# Patient Record
Sex: FEMALE | Race: WHITE | HISPANIC OR LATINO | Employment: FULL TIME | URBAN - METROPOLITAN AREA
[De-identification: names, ages, dates, MRNs, and addresses within clinical notes are randomized per-mention and may not be internally consistent; named-entity substitution may affect disease eponyms.]

---

## 2017-01-09 ENCOUNTER — OFFICE VISIT (OUTPATIENT)
Dept: MEDICAL GROUP | Facility: MEDICAL CENTER | Age: 57
End: 2017-01-09
Payer: COMMERCIAL

## 2017-01-09 VITALS
RESPIRATION RATE: 16 BRPM | WEIGHT: 152 LBS | SYSTOLIC BLOOD PRESSURE: 128 MMHG | DIASTOLIC BLOOD PRESSURE: 72 MMHG | BODY MASS INDEX: 27.97 KG/M2 | TEMPERATURE: 97.2 F | OXYGEN SATURATION: 95 % | HEIGHT: 62 IN | HEART RATE: 82 BPM

## 2017-01-09 DIAGNOSIS — J01.90 ACUTE NON-RECURRENT SINUSITIS, UNSPECIFIED LOCATION: ICD-10-CM

## 2017-01-09 DIAGNOSIS — J45.20 MILD INTERMITTENT ASTHMA WITHOUT COMPLICATION: ICD-10-CM

## 2017-01-09 DIAGNOSIS — Z23 NEED FOR PNEUMOCOCCAL VACCINATION: ICD-10-CM

## 2017-01-09 PROCEDURE — 90471 IMMUNIZATION ADMIN: CPT | Performed by: NURSE PRACTITIONER

## 2017-01-09 PROCEDURE — 99214 OFFICE O/P EST MOD 30 MIN: CPT | Mod: 25 | Performed by: NURSE PRACTITIONER

## 2017-01-09 PROCEDURE — 90732 PPSV23 VACC 2 YRS+ SUBQ/IM: CPT | Performed by: NURSE PRACTITIONER

## 2017-01-09 RX ORDER — DOXYCYCLINE HYCLATE 100 MG
100 TABLET ORAL 2 TIMES DAILY
Qty: 20 TAB | Refills: 0 | Status: SHIPPED | OUTPATIENT
Start: 2017-01-09 | End: 2017-07-25

## 2017-01-09 ASSESSMENT — ENCOUNTER SYMPTOMS
COUGH: 1
WHEEZING: 1

## 2017-01-09 NOTE — MR AVS SNAPSHOT
"Lyn Morris   2017 2:40 PM   Office Visit   MRN: [id number]    Department:  [address] [place] [address]   Dept Phone:  [phone]    Description:  Female : 1960   Provider:  BHARGAV Atwood           Reason for Visit     Sinus Problem pressure     Cough           Allergies as of 2017     No Known Allergies      You were diagnosed with     Acute non-recurrent sinusitis, unspecified location   [4989167]       Mild intermittent asthma without complication   [243849]       Need for pneumococcal vaccination   [200907]         Vital Signs     Blood Pressure Pulse Temperature Respirations Height Weight    128/72 mmHg 82 36.2 °C (97.2 °F) 16 1.575 m (5' 2.01\") 68.947 kg (152 lb)    Body Mass Index Oxygen Saturation Last Menstrual Period Smoking Status          27.79 kg/m2 95% 2008 Never Smoker         Basic Information     Date Of Birth Sex Race Ethnicity Preferred Language    1960 Female  or   Origin (Bulgarian,Chadian,Faroese,Lawrence, etc) English      Your appointments     2017 10:10 AM   MA SCRN10 with RB MG 3   [facility] ([address])    [address] [place] [address] [address]  [facility] [id number] [phone]           No deodorant, powder, perfume or lotion under the arm or breast area.              Problem List              ICD-10-CM Priority Class Noted - Resolved    History of hepatitis C Z86.19   2010 - Present    Asthma, mild intermittent J45.20   2015 - Present    Urinary incontinence R32   2015 - Present    Seasonal allergies J30.2   2016 - Present      Health Maintenance        Date Due Completion Dates    IMM DTaP/Tdap/Td Vaccine (1 - Tdap) 1980 ---    IMM PNEUMOCOCCAL 19-64 (ADULT) MEDIUM RISK SERIES (1 of 1 - PPSV23) 1980 ---    IMM INFLUENZA (1) 2016 10/5/2012    MAMMOGRAM 2017, 2014, 2013, 2012, 6/3/2011, 2010, 2010, 2008, 2008, 2008, " 2/5/2008, 8/20/2007, 8/20/2007, 4/5/2006, 3/30/2005, 3/15/2005    COLONOSCOPY 2/25/2024 2/25/2014 (N/S), 11/1/2003 (Prv Comp)    Override on 2/25/2014: (N/S)    Override on 11/1/2003: Previously completed            Current Immunizations     Influenza TIV (IM) 10/5/2012    Influenza Vaccine Quad Inj (Preserved) 10/23/2016    Pneumococcal polysaccharide vaccine (PPSV-23) 1/9/2017  2:53 PM      Below and/or attached are the medications your provider expects you to take. Review all of your home medications and newly ordered medications with your provider and/or pharmacist. Follow medication instructions as directed by your provider and/or pharmacist. Please keep your medication list with you and share with your provider. Update the information when medications are discontinued, doses are changed, or new medications (including over-the-counter products) are added; and carry medication information at all times in the event of emergency situations     Allergies:  No Known Allergies          Medications  Valid as of: January 09, 2017 -  3:35 PM    Generic Name Brand Name Tablet Size Instructions for use    Albuterol Sulfate (Aero Soln) albuterol 108 (90 BASE) MCG/ACT Inhale 2 Puffs by mouth every 6 hours as needed for Shortness of Breath.        Doxycycline Hyclate (Tab) VIBRAMYCIN 100 MG Take 1 Tab by mouth 2 times a day.        Fluticasone Propionate (Suspension) FLONASE 50 MCG/ACT Spray 2 Sprays in nose every day.        Levothyroxine Sodium (Tab) SYNTHROID 88 MCG TAKE ONE TABLET BY MOUTH ONCE DAILY        .                 Medicines prescribed today were sent to:     [facility] [address] [place] [address] - [address] [place] [address]    Phone: [phone] Fax: [phone]    Open 24 Hours?: No      Medication refill instructions:       If your prescription bottle indicates you have medication refills left, it is not necessary to call your provider’s office. Please contact your  pharmacy and they will refill your medication.    If your prescription bottle indicates you do not have any refills left, you may request refills at any time through one of the following ways: The online [employer] system (except Urgent Care), by calling your provider’s office, or by asking your pharmacy to contact your provider’s office with a refill request. Medication refills are processed only during regular business hours and may not be available until the next business day. Your provider may request additional information or to have a follow-up visit with you prior to refilling your medication.   *Please Note: Medication refills are assigned a new Rx number when refilled electronically. Your pharmacy may indicate that no refills were authorized even though a new prescription for the same medication is available at the pharmacy. Please request the medicine by name with the pharmacy before contacting your provider for a refill.           [employer] Access Code: Activation code not generated  Current [employer] Status: Active

## 2017-01-09 NOTE — PROGRESS NOTES
Subjective:      Lyn Morris is a 56 y.o. female who presents with Sinus Problem and Cough            Sinus Problem  Associated symptoms include congestion and coughing.   Cough  Associated symptoms include wheezing.   Lyn Morris is here today for continuing sinus issues.     Patient reports that for about 3 weeks she has been having symptoms. They tend to come and go and are mostly sinus pain and pressure, cough, and expectoration of phlegm. She has tried Flonase nasal spray and once used an antihistamine but nothing seems to help. Symptoms are worse when she lays down. She denies fever, chills, ear pain, chest pain or shortness of breath. She does have a history of asthma for which she uses albuterol infrequently. She has not needed preventative inhalers.        Social History   Substance Use Topics   • Smoking status: Never Smoker    • Smokeless tobacco: Never Used   • Alcohol Use: No     Current Outpatient Prescriptions   Medication Sig Dispense Refill   • doxycycline (VIBRAMYCIN) 100 MG Tab Take 1 Tab by mouth 2 times a day. 20 Tab 0   • levothyroxine (SYNTHROID) 88 MCG Tab TAKE ONE TABLET BY MOUTH ONCE DAILY 90 Tab 3   • fluticasone (FLONASE) 50 MCG/ACT nasal spray Spray 2 Sprays in nose every day. 16 g 11   • albuterol (VENTOLIN OR PROVENTIL) 108 (90 BASE) MCG/ACT AERS inhalation aerosol Inhale 2 Puffs by mouth every 6 hours as needed for Shortness of Breath. 8.5 g 3     No current facility-administered medications for this visit.     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 Bhagat   • Hypertension      resolved 2012   • Family history of diabetes mellitus      Family History   Problem Relation Age of Onset   • Cancer Sister      breast   • Hypertension Father    • Diabetes Father    • Hypertension Brother        Review of Systems   HENT: Positive for congestion.    Respiratory: Positive for  "cough and wheezing.    All other systems reviewed and are negative.         Objective:     /72 mmHg  Pulse 82  Temp(Src) 36.2 °C (97.2 °F)  Resp 16  Ht 1.575 m (5' 2.01\")  Wt 68.947 kg (152 lb)  BMI 27.79 kg/m2  SpO2 95%  LMP 12/04/2008     Physical Exam   Constitutional: She is oriented to person, place, and time. She appears well-developed and well-nourished. No distress.   HENT:   Head: Normocephalic and atraumatic.   Right Ear: External ear normal.   Left Ear: External ear normal.   Nose: Right sinus exhibits maxillary sinus tenderness. Left sinus exhibits maxillary sinus tenderness.   Eyes: Right eye exhibits no discharge. Left eye exhibits no discharge.   Neck: Normal range of motion. Neck supple. No thyromegaly present.   Cardiovascular: Normal rate, regular rhythm and normal heart sounds.  Exam reveals no gallop and no friction rub.    No murmur heard.  Pulmonary/Chest: Effort normal and breath sounds normal. She has no wheezes. She has no rales.   Musculoskeletal: She exhibits no edema or tenderness.   Neurological: She is alert and oriented to person, place, and time. She displays normal reflexes.   Skin: Skin is warm and dry. No rash noted. She is not diaphoretic.   Psychiatric: She has a normal mood and affect. Her behavior is normal. Judgment and thought content normal.   Nursing note and vitals reviewed.              Assessment/Plan:     1. Acute non-recurrent sinusitis, unspecified location  Sinus infection teaching provided including;  A. Take antibiotics as prescribed until finished.  B. Use over-the-counter Tylenol, Aleve, or ibuprofen as needed for discomfort.  C. Consider Mucinex to help loosen secretions.  D. Sinus irrigations may be helpful. Consider using a Nedi Pot. Normal saline spray may help prevent sinus irritation in the future.  E. Increase humidity in your home with a humidifier or putting head over a steaming pot of water.  F. Consider seeing a ENT if symptoms do not " improve or if symptoms becomes recurrent.  - doxycycline (VIBRAMYCIN) 100 MG Tab; Take 1 Tab by mouth 2 times a day.  Dispense: 20 Tab; Refill: 0    2. Mild intermittent asthma without complication  Patient at this point does not need a preventative inhaler but should always have albuterol available when she is short of breath and a prescription will be sent when needed.    3. Need for pneumococcal vaccination  I have placed the below orders and discussed them with an approved delegating provider. The MA is performing the below orders under the direction of Dr. Vega    - PNEUMOCOCCAL POLYSACCHARIDE VACCINE 23-VALENT =>1YO SQ/IM

## 2017-02-09 ENCOUNTER — HOSPITAL ENCOUNTER (OUTPATIENT)
Dept: RADIOLOGY | Facility: MEDICAL CENTER | Age: 57
End: 2017-02-09
Attending: NURSE PRACTITIONER
Payer: COMMERCIAL

## 2017-02-09 DIAGNOSIS — Z12.31 SCREENING MAMMOGRAM, ENCOUNTER FOR: ICD-10-CM

## 2017-02-09 PROCEDURE — 77063 BREAST TOMOSYNTHESIS BI: CPT

## 2017-02-14 ENCOUNTER — HOSPITAL ENCOUNTER (OUTPATIENT)
Dept: LAB | Facility: MEDICAL CENTER | Age: 57
End: 2017-02-14
Attending: SPECIALIST
Payer: COMMERCIAL

## 2017-02-14 LAB
ALBUMIN SERPL BCP-MCNC: 4.2 G/DL (ref 3.2–4.9)
ALBUMIN/GLOB SERPL: 1.1 G/DL
ALP SERPL-CCNC: 127 U/L (ref 30–99)
ALT SERPL-CCNC: 20 U/L (ref 2–50)
ANION GAP SERPL CALC-SCNC: 5 MMOL/L (ref 0–11.9)
AST SERPL-CCNC: 19 U/L (ref 12–45)
BASOPHILS # BLD AUTO: 0.04 K/UL (ref 0–0.12)
BASOPHILS NFR BLD AUTO: 1.1 % (ref 0–1.8)
BILIRUB SERPL-MCNC: 0.5 MG/DL (ref 0.1–1.5)
BUN SERPL-MCNC: 15 MG/DL (ref 8–22)
CALCIUM SERPL-MCNC: 9.5 MG/DL (ref 8.5–10.5)
CHLORIDE SERPL-SCNC: 105 MMOL/L (ref 96–112)
CO2 SERPL-SCNC: 27 MMOL/L (ref 20–33)
CREAT SERPL-MCNC: 0.63 MG/DL (ref 0.5–1.4)
EOSINOPHIL # BLD: 0.02 K/UL (ref 0–0.51)
EOSINOPHIL NFR BLD AUTO: 0.5 % (ref 0–6.9)
ERYTHROCYTE [DISTWIDTH] IN BLOOD BY AUTOMATED COUNT: 47.6 FL (ref 35.9–50)
GLOBULIN SER CALC-MCNC: 3.8 G/DL (ref 1.9–3.5)
GLUCOSE SERPL-MCNC: 95 MG/DL (ref 65–99)
HCT VFR BLD AUTO: 44.7 % (ref 37–47)
HGB BLD-MCNC: 15.1 G/DL (ref 12–16)
IMM GRANULOCYTES # BLD AUTO: 0 K/UL (ref 0–0.11)
IMM GRANULOCYTES NFR BLD AUTO: 0 % (ref 0–0.9)
LYMPHOCYTES # BLD: 1.07 K/UL (ref 1–4.8)
LYMPHOCYTES NFR BLD AUTO: 28.3 % (ref 22–41)
MCH RBC QN AUTO: 33.3 PG (ref 27–33)
MCHC RBC AUTO-ENTMCNC: 33.8 G/DL (ref 33.6–35)
MCV RBC AUTO: 98.5 FL (ref 81.4–97.8)
MONOCYTES # BLD: 0.52 K/UL (ref 0–0.85)
MONOCYTES NFR BLD AUTO: 13.8 % (ref 0–13.4)
NEUTROPHILS # BLD: 2.13 K/UL (ref 2–7.15)
NEUTROPHILS NFR BLD AUTO: 56.3 % (ref 44–72)
NRBC # BLD AUTO: 0 K/UL
NRBC BLD-RTO: 0 /100 WBC
PLATELET # BLD AUTO: 197 K/UL (ref 164–446)
PMV BLD AUTO: 10.9 FL (ref 9–12.9)
POTASSIUM SERPL-SCNC: 4 MMOL/L (ref 3.6–5.5)
PROT SERPL-MCNC: 8 G/DL (ref 6–8.2)
RBC # BLD AUTO: 4.54 M/UL (ref 4.2–5.4)
SODIUM SERPL-SCNC: 137 MMOL/L (ref 135–145)
WBC # BLD AUTO: 3.8 K/UL (ref 4.8–10.8)

## 2017-02-14 PROCEDURE — 36415 COLL VENOUS BLD VENIPUNCTURE: CPT

## 2017-02-14 PROCEDURE — 82105 ALPHA-FETOPROTEIN SERUM: CPT

## 2017-02-14 PROCEDURE — 85025 COMPLETE CBC W/AUTO DIFF WBC: CPT

## 2017-02-14 PROCEDURE — 87522 HEPATITIS C REVRS TRNSCRPJ: CPT

## 2017-02-14 PROCEDURE — 80053 COMPREHEN METABOLIC PANEL: CPT

## 2017-02-16 LAB — AFP-TM SERPL-MCNC: 3 NG/ML (ref 0–9)

## 2017-02-24 LAB
DETECTION Z3100: NORMAL
HCV RNA # SERPL NAA+PROBE: NORMAL COPIES/ML
HCV RNA SERPL NAA+PROBE-ACNC: NORMAL IU/ML
HCV RNA SERPL NAA+PROBE-LOG IU: NORMAL LOG10 IU/ML
HCV RNA SERPL NAA+PROBE-LOG#: NORMAL LOG10COPY/ML
TEST INFO  93644: NORMAL

## 2017-03-03 ENCOUNTER — HOSPITAL ENCOUNTER (OUTPATIENT)
Dept: RADIOLOGY | Facility: MEDICAL CENTER | Age: 57
End: 2017-03-03
Attending: SPECIALIST
Payer: COMMERCIAL

## 2017-03-03 DIAGNOSIS — B18.2 CHRONIC HEPATITIS C WITH HEPATIC COMA (HCC): ICD-10-CM

## 2017-03-03 PROCEDURE — 76700 US EXAM ABDOM COMPLETE: CPT

## 2017-03-30 ENCOUNTER — HOSPITAL ENCOUNTER (OUTPATIENT)
Dept: RADIOLOGY | Facility: MEDICAL CENTER | Age: 57
End: 2017-03-30
Attending: SPECIALIST
Payer: COMMERCIAL

## 2017-03-30 DIAGNOSIS — R16.0 LIVER MASS: ICD-10-CM

## 2017-03-30 PROCEDURE — 74183 MRI ABD W/O CNTR FLWD CNTR: CPT

## 2017-03-30 PROCEDURE — 700117 HCHG RX CONTRAST REV CODE 255: Performed by: SPECIALIST

## 2017-03-30 PROCEDURE — A9579 GAD-BASE MR CONTRAST NOS,1ML: HCPCS | Performed by: SPECIALIST

## 2017-03-30 RX ADMIN — GADODIAMIDE 15 ML: 287 INJECTION INTRAVENOUS at 13:45

## 2017-07-25 ENCOUNTER — OFFICE VISIT (OUTPATIENT)
Dept: MEDICAL GROUP | Facility: MEDICAL CENTER | Age: 57
End: 2017-07-25
Payer: COMMERCIAL

## 2017-07-25 ENCOUNTER — HOSPITAL ENCOUNTER (OUTPATIENT)
Dept: LAB | Facility: MEDICAL CENTER | Age: 57
End: 2017-07-25
Attending: FAMILY MEDICINE
Payer: COMMERCIAL

## 2017-07-25 VITALS
WEIGHT: 154 LBS | HEIGHT: 62 IN | TEMPERATURE: 98.6 F | OXYGEN SATURATION: 97 % | RESPIRATION RATE: 14 BRPM | HEART RATE: 72 BPM | SYSTOLIC BLOOD PRESSURE: 124 MMHG | DIASTOLIC BLOOD PRESSURE: 76 MMHG | BODY MASS INDEX: 28.34 KG/M2

## 2017-07-25 DIAGNOSIS — R11.0 NAUSEA: ICD-10-CM

## 2017-07-25 DIAGNOSIS — R74.8 ELEVATED ALKALINE PHOSPHATASE LEVEL: ICD-10-CM

## 2017-07-25 LAB
ERYTHROCYTE [DISTWIDTH] IN BLOOD BY AUTOMATED COUNT: 47.1 FL (ref 35.9–50)
GGT SERPL-CCNC: 12 U/L (ref 7–34)
HCT VFR BLD AUTO: 41.2 % (ref 37–47)
HGB BLD-MCNC: 14 G/DL (ref 12–16)
MCH RBC QN AUTO: 33.7 PG (ref 27–33)
MCHC RBC AUTO-ENTMCNC: 34 G/DL (ref 33.6–35)
MCV RBC AUTO: 99.3 FL (ref 81.4–97.8)
PLATELET # BLD AUTO: 221 K/UL (ref 164–446)
PMV BLD AUTO: 11.4 FL (ref 9–12.9)
RBC # BLD AUTO: 4.15 M/UL (ref 4.2–5.4)
WBC # BLD AUTO: 6.2 K/UL (ref 4.8–10.8)

## 2017-07-25 PROCEDURE — 36415 COLL VENOUS BLD VENIPUNCTURE: CPT

## 2017-07-25 PROCEDURE — 82977 ASSAY OF GGT: CPT

## 2017-07-25 PROCEDURE — 80053 COMPREHEN METABOLIC PANEL: CPT

## 2017-07-25 PROCEDURE — 99213 OFFICE O/P EST LOW 20 MIN: CPT | Performed by: FAMILY MEDICINE

## 2017-07-25 PROCEDURE — 83690 ASSAY OF LIPASE: CPT

## 2017-07-25 PROCEDURE — 85027 COMPLETE CBC AUTOMATED: CPT

## 2017-07-25 ASSESSMENT — PATIENT HEALTH QUESTIONNAIRE - PHQ9: CLINICAL INTERPRETATION OF PHQ2 SCORE: 0

## 2017-07-25 NOTE — PROGRESS NOTES
cc: Nausea    Subjective:     Lyn Morris is a 57 y.o. female presenting with her daughter with nausea that started yesterday. After dinner, she developed nausea and vomiting, some dizziness as well. This morning she woke up somewhat nauseated. Has not vomited yet today. Seems to be improving. Denies any associated symptoms. Denies fevers, headaches, neck pain, chest pain, abdominal pain, urinary symptoms, vaginal symptoms, numbness, tingling, rashes, recent travel, new foods, new medications. She does have a history of hepatitis C and a liver lesion on ultrasound back in March, MRI showing a liver cyst. She has not followed-up with GI since    Review of systems:  See above.      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 Bhagat   • Hypertension      resolved 2012   • Family history of diabetes mellitus      Past Surgical History   Procedure Laterality Date   • Vaginal hysterectomy total  12/11/2008     Performed by KATY CONN at SURGERY SAME DAY [facility]   • Anterior and posterior repair  12/11/2008     Performed by KATY CONN at SURGERY SAME DAY [facility]   • Colonoscopy  2003     normal     Family History   Problem Relation Age of Onset   • Cancer Sister      breast   • Hypertension Father    • Diabetes Father    • Hypertension Brother      Social History     Social History   • Marital Status:      Spouse Name: N/A   • Number of Children: N/A  "  • Years of Education: N/A     Occupational History   • [employer], CNA      Social History Main Topics   • Smoking status: Never Smoker    • Smokeless tobacco: Never Used   • Alcohol Use: No   • Drug Use: No   • Sexual Activity:     Partners: Male     Other Topics Concern   • Not on file     Social History Narrative       Objective:     Vitals: /76 mmHg  Pulse 72  Temp(Src) 37 °C (98.6 °F)  Resp 14  Ht 1.575 m (5' 2\")  Wt 69.854 kg (154 lb)  BMI 28.16 kg/m2  SpO2 97%  LMP 12/04/2008  General: Alert, pleasant, NAD  HEENT: Normocephalic.  PERRL, EOMI, no icterus or pallor.  Conjunctivae and lids normal. External ears normal. Neck supple.  No thyromegaly or masses palpated. No cervical or supraclavicular lymphadenopathy.  Heart: Regular rate and rhythm.  S1 and S2 normal.  No murmurs appreciated.  Respiratory: Normal respiratory effort.  Clear to auscultation bilaterally.  Abdomen: Non-distended, soft, non-tender, no guarding/rebound. Bowel sounds present.  No hepatosplenomegaly.  No hernias.  Skin: Warm, dry, no rashes.  Musculoskeletal: Gait is normal.  Moves all extremities well.  Extremities: No leg edema.    Neurological: No tremors  Psych:  Affect/mood is normal, judgement is good, memory is intact, grooming is appropriate.    MRI 3/2017:  1.  No solid mass is identified to definitively correlate with subcentimeter lesion described on recent ultrasound.  2.  5 mm lesion in segment 8 is consistent with a cyst, possibly proteinaceous resulting in a hyperechoic appearance on ultrasound.  3.  Dilated portal vein is suggestive of portal venous hypertension.    Assessment/Plan:     Lyn was seen today for nausea.    Diagnoses and all orders for this visit:    Nausea  Elevated alkaline phosphatase level  Possible viral gastroenteritis, however given her MRI and elevated alkaline phosphatase level, we'll repeat the labs. Advised her to follow-up with GI as well. Offered prescription for Zofran, however " patient declined. She feels like she is improving on her own. Advised a brat diet  -     CBC WITHOUT DIFFERENTIAL; Future  -     COMP METABOLIC PANEL; Future  -     GAMMA GT (GGT); Future  -     LIPASE; Future      Return if symptoms worsen or fail to improve.

## 2017-07-25 NOTE — MR AVS SNAPSHOT
"Lyn Morris   2017 2:00 PM   Office Visit   MRN: [id number]    Department:  [address] [place] [address]   Dept Phone:  [phone]    Description:  Female : 1960   Provider:  Cornelius Lofton M.D.           Reason for Visit     Nausea x 1 day      Allergies as of 2017     No Known Allergies      You were diagnosed with     Nausea   [860227]       Elevated alkaline phosphatase level   [146233]         Vital Signs     Blood Pressure Pulse Temperature Respirations Height Weight    124/76 mmHg 72 37 °C (98.6 °F) 14 1.575 m (5' 2\") 69.854 kg (154 lb)    Body Mass Index Oxygen Saturation Last Menstrual Period Smoking Status          28.16 kg/m2 97% 2008 Never Smoker         Basic Information     Date Of Birth Sex Race Ethnicity Preferred Language    1960 Female  or   Origin (Kyrgyz,Bahraini,Chilean,Lawrence, etc) English      Your appointments     2017  2:00 PM   Established Patient with Cornelius Lofton M.D.   [address])    [address] [place] [address]  [facility] [id number]   [phone]           You will be receiving a confirmation call a few days before your appointment from our automated call confirmation system.              Problem List              ICD-10-CM Priority Class Noted - Resolved    History of hepatitis C Z86.19   2010 - Present    Asthma, mild intermittent J45.20   2015 - Present    Urinary incontinence R32   2015 - Present    Seasonal allergies J30.2   2016 - Present      Health Maintenance        Date Due Completion Dates    IMM DTaP/Tdap/Td Vaccine (1 - Tdap) 1980 ---    IMM INFLUENZA (1) 2017 10/23/2016, 10/5/2012    MAMMOGRAM 2018, 2016, 2014, 2013, 2012, 6/3/2011, 2010, 2010, 2008, 2008, 2008, 2008, 2007, 2007, 2006, 3/30/2005, 3/15/2005    COLONOSCOPY 2/3/2027 2/3/2017            Current Immunizations    "    Influenza TIV (IM) 10/5/2012    Influenza Vaccine Quad Inj (Preserved) 10/23/2016    Pneumococcal polysaccharide vaccine (PPSV-23) 1/9/2017  2:53 PM      Below and/or attached are the medications your provider expects you to take. Review all of your home medications and newly ordered medications with your provider and/or pharmacist. Follow medication instructions as directed by your provider and/or pharmacist. Please keep your medication list with you and share with your provider. Update the information when medications are discontinued, doses are changed, or new medications (including over-the-counter products) are added; and carry medication information at all times in the event of emergency situations     Allergies:  No Known Allergies          Medications  Valid as of: July 25, 2017 -  1:54 PM    Generic Name Brand Name Tablet Size Instructions for use    Albuterol Sulfate (Aero Soln) albuterol 108 (90 BASE) MCG/ACT Inhale 2 Puffs by mouth every 6 hours as needed for Shortness of Breath.        Fluticasone Propionate (Suspension) FLONASE 50 MCG/ACT Spray 2 Sprays in nose every day.        Levothyroxine Sodium (Tab) SYNTHROID 88 MCG TAKE ONE TABLET BY MOUTH ONCE DAILY        .                 Medicines prescribed today were sent to:     [facility] [address] [place] [id number]    Phone: [phone] Fax: [phone]    Open 24 Hours?: No      Medication refill instructions:       If your prescription bottle indicates you have medication refills left, it is not necessary to call your provider’s office. Please contact your pharmacy and they will refill your medication.    If your prescription bottle indicates you do not have any refills left, you may request refills at any time through one of the following ways: The online [employer] system (except Urgent Care), by calling your provider’s office, or by asking your pharmacy to contact your provider’s  office with a refill request. Medication refills are processed only during regular business hours and may not be available until the next business day. Your provider may request additional information or to have a follow-up visit with you prior to refilling your medication.   *Please Note: Medication refills are assigned a new Rx number when refilled electronically. Your pharmacy may indicate that no refills were authorized even though a new prescription for the same medication is available at the pharmacy. Please request the medicine by name with the pharmacy before contacting your provider for a refill.        Your To Do List     Future Labs/Procedures Complete By Expires    CBC WITHOUT DIFFERENTIAL  As directed 1/22/2018    COMP METABOLIC PANEL  As directed 7/25/2018    GAMMA GT (GGT)  As directed 7/25/2018    LIPASE  As directed 7/25/2018         MyChart Access Code: Activation code not generated  Current [employer] Status: Active

## 2017-07-26 LAB
ALBUMIN SERPL BCP-MCNC: 4 G/DL (ref 3.2–4.9)
ALBUMIN/GLOB SERPL: 1.1 G/DL
ALP SERPL-CCNC: 107 U/L (ref 30–99)
ALT SERPL-CCNC: 18 U/L (ref 2–50)
ANION GAP SERPL CALC-SCNC: 8 MMOL/L (ref 0–11.9)
AST SERPL-CCNC: 25 U/L (ref 12–45)
BILIRUB SERPL-MCNC: 0.5 MG/DL (ref 0.1–1.5)
BUN SERPL-MCNC: 11 MG/DL (ref 8–22)
CALCIUM SERPL-MCNC: 9 MG/DL (ref 8.5–10.5)
CHLORIDE SERPL-SCNC: 104 MMOL/L (ref 96–112)
CO2 SERPL-SCNC: 25 MMOL/L (ref 20–33)
CREAT SERPL-MCNC: 0.65 MG/DL (ref 0.5–1.4)
GFR SERPL CREATININE-BSD FRML MDRD: >60 ML/MIN/1.73 M 2
GLOBULIN SER CALC-MCNC: 3.5 G/DL (ref 1.9–3.5)
GLUCOSE SERPL-MCNC: 123 MG/DL (ref 65–99)
LIPASE SERPL-CCNC: 20 U/L (ref 11–82)
POTASSIUM SERPL-SCNC: 3.3 MMOL/L (ref 3.6–5.5)
PROT SERPL-MCNC: 7.5 G/DL (ref 6–8.2)
SODIUM SERPL-SCNC: 137 MMOL/L (ref 135–145)

## 2017-09-22 ENCOUNTER — HOSPITAL ENCOUNTER (OUTPATIENT)
Dept: LAB | Facility: MEDICAL CENTER | Age: 57
End: 2017-09-22
Attending: SPECIALIST
Payer: COMMERCIAL

## 2017-09-22 LAB
ALBUMIN SERPL BCP-MCNC: 4.1 G/DL (ref 3.2–4.9)
ALBUMIN/GLOB SERPL: 1.1 G/DL
ALP SERPL-CCNC: 110 U/L (ref 30–99)
ALT SERPL-CCNC: 18 U/L (ref 2–50)
ANION GAP SERPL CALC-SCNC: 9 MMOL/L (ref 0–11.9)
AST SERPL-CCNC: 22 U/L (ref 12–45)
BILIRUB SERPL-MCNC: 0.6 MG/DL (ref 0.1–1.5)
BUN SERPL-MCNC: 16 MG/DL (ref 8–22)
CALCIUM SERPL-MCNC: 9.4 MG/DL (ref 8.5–10.5)
CHLORIDE SERPL-SCNC: 106 MMOL/L (ref 96–112)
CO2 SERPL-SCNC: 24 MMOL/L (ref 20–33)
CREAT SERPL-MCNC: 0.58 MG/DL (ref 0.5–1.4)
GFR SERPL CREATININE-BSD FRML MDRD: >60 ML/MIN/1.73 M 2
GLOBULIN SER CALC-MCNC: 3.8 G/DL (ref 1.9–3.5)
GLUCOSE SERPL-MCNC: 90 MG/DL (ref 65–99)
POTASSIUM SERPL-SCNC: 3.5 MMOL/L (ref 3.6–5.5)
PROT SERPL-MCNC: 7.9 G/DL (ref 6–8.2)
SODIUM SERPL-SCNC: 139 MMOL/L (ref 135–145)

## 2017-09-22 PROCEDURE — 36415 COLL VENOUS BLD VENIPUNCTURE: CPT

## 2017-09-22 PROCEDURE — 82105 ALPHA-FETOPROTEIN SERUM: CPT

## 2017-09-22 PROCEDURE — 80053 COMPREHEN METABOLIC PANEL: CPT

## 2017-09-24 LAB — AFP-TM SERPL-MCNC: 3 NG/ML (ref 0–9)

## 2017-09-28 ENCOUNTER — OFFICE VISIT (OUTPATIENT)
Dept: MEDICAL GROUP | Facility: MEDICAL CENTER | Age: 57
End: 2017-09-28
Payer: COMMERCIAL

## 2017-09-28 ENCOUNTER — HOSPITAL ENCOUNTER (OUTPATIENT)
Dept: LAB | Facility: MEDICAL CENTER | Age: 57
End: 2017-09-28
Attending: NURSE PRACTITIONER
Payer: COMMERCIAL

## 2017-09-28 ENCOUNTER — HOSPITAL ENCOUNTER (OUTPATIENT)
Dept: RADIOLOGY | Facility: MEDICAL CENTER | Age: 57
End: 2017-09-28
Attending: NURSE PRACTITIONER
Payer: COMMERCIAL

## 2017-09-28 VITALS
BODY MASS INDEX: 27.97 KG/M2 | HEART RATE: 63 BPM | TEMPERATURE: 98.1 F | DIASTOLIC BLOOD PRESSURE: 80 MMHG | RESPIRATION RATE: 14 BRPM | HEIGHT: 62 IN | WEIGHT: 152 LBS | SYSTOLIC BLOOD PRESSURE: 130 MMHG | OXYGEN SATURATION: 96 %

## 2017-09-28 DIAGNOSIS — Z86.19 HISTORY OF HEPATITIS C: ICD-10-CM

## 2017-09-28 DIAGNOSIS — M79.642 HAND PAIN, LEFT: ICD-10-CM

## 2017-09-28 DIAGNOSIS — K76.89 LIVER CYST: ICD-10-CM

## 2017-09-28 LAB
ERYTHROCYTE [SEDIMENTATION RATE] IN BLOOD BY WESTERGREN METHOD: 19 MM/HOUR (ref 0–30)
RHEUMATOID FACT SER IA-ACNC: <10 IU/ML (ref 0–14)

## 2017-09-28 PROCEDURE — 86431 RHEUMATOID FACTOR QUANT: CPT

## 2017-09-28 PROCEDURE — 36415 COLL VENOUS BLD VENIPUNCTURE: CPT

## 2017-09-28 PROCEDURE — 85652 RBC SED RATE AUTOMATED: CPT

## 2017-09-28 PROCEDURE — 99214 OFFICE O/P EST MOD 30 MIN: CPT | Performed by: NURSE PRACTITIONER

## 2017-09-28 PROCEDURE — 84550 ASSAY OF BLOOD/URIC ACID: CPT

## 2017-09-28 PROCEDURE — 86200 CCP ANTIBODY: CPT

## 2017-09-28 PROCEDURE — 77077 JOINT SURVEY SINGLE VIEW: CPT

## 2017-09-28 RX ORDER — MELOXICAM 7.5 MG/1
7.5 TABLET ORAL DAILY
Qty: 30 TAB | Refills: 1 | Status: SHIPPED | OUTPATIENT
Start: 2017-09-28 | End: 2018-07-24

## 2017-09-28 ASSESSMENT — PAIN SCALES - GENERAL: PAINLEVEL: NO PAIN

## 2017-09-28 NOTE — PROGRESS NOTES
Subjective:      Lyn Morris is a 57 y.o. female who presents with Finger Stiffness (2 weeks)            HPI Lyn MorrisIs here today mainly for left hand pain.      1. Hand pain, left  Patient reports that for at least 3 weeks she has had pain and stiffness of the fingers of her left hand. It mostly affects her second through fourth fingers and mostly at the DIP joints. She states there is no redness or swelling but they feel stiff and painful with movement. 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    2. Liver cyst  Patient had MRI done through gastroenterology and apparently there was a cyst found so they want to do a repeat ultrasound in the next month. She denies abdominal pain.    3. History of hepatitis C  Patient states she is stable as far as her hepatitis C after treatment but does follow with gastroenterology regularly.  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 Bhagat   • Family history of diabetes mellitus    • Hypertension     resolved 2012     Current Outpatient Prescriptions   Medication Sig Dispense Refill   • meloxicam (MOBIC) 7.5 MG Tab Take 1 Tab by mouth every day. 30 Tab 1   • levothyroxine (SYNTHROID) 88 MCG Tab TAKE ONE TABLET BY MOUTH ONCE DAILY 90 Tab 3   • fluticasone (FLONASE) 50 MCG/ACT nasal spray Spray 2 Sprays in nose every day. 16 g 11   • albuterol (VENTOLIN OR PROVENTIL) 108 (90 BASE) MCG/ACT AERS inhalation aerosol Inhale 2 Puffs by mouth every 6 hours as needed for Shortness of Breath. 8.5 g 3     No current facility-administered medications for this visit.      Social History   Substance Use Topics   • Smoking status: Never Smoker   • Smokeless tobacco: Never Used   • Alcohol use No     Family History   Problem Relation Age of Onset   • Cancer Sister      breast   • Hypertension Father    • Diabetes Father    • Hypertension  "Brother        Review of Systems   Musculoskeletal: Positive for joint pain.   All other systems reviewed and are negative.         Objective:     /80   Pulse 63   Temp 36.7 °C (98.1 °F)   Resp 14   Ht 1.575 m (5' 2.01\")   Wt 68.9 kg (152 lb)   LMP 12/04/2008   SpO2 96%   BMI 27.79 kg/m²      Physical Exam   Constitutional: She is oriented to person, place, and time. She appears well-developed and well-nourished. No distress.   HENT:   Head: Normocephalic and atraumatic.   Right Ear: External ear normal.   Left Ear: External ear normal.   Nose: Nose normal.   Eyes: Right eye exhibits no discharge. Left eye exhibits no discharge.   Neck: Normal range of motion. Neck supple. No thyromegaly present.   Cardiovascular: Normal rate, regular rhythm and normal heart sounds.  Exam reveals no gallop and no friction rub.    No murmur heard.  Pulmonary/Chest: Effort normal and breath sounds normal. She has no wheezes. She has no rales.   Musculoskeletal: She exhibits no edema or tenderness.   Patient has discomfort with flexion and extension of the second through fourth fingers of the left hand but no redness or swelling.   Neurological: She is alert and oriented to person, place, and time. She displays normal reflexes.   Skin: Skin is warm and dry. No rash noted. She is not diaphoretic.   Psychiatric: She has a normal mood and affect. Her behavior is normal. Judgment and thought content normal.   Nursing note and vitals reviewed.              Assessment/Plan:     1. Hand pain, left  This may be from overuse as she works as a transport aide but I will do a rheumatoid workup and x-rays and start patient on anti-inflammatory to short-term for pain. She will be referred to rheumatology if it does not improve.  - RHEUMATOID ARTHRITIS FACTOR; Future  - CCP ANTIBODY; Future  - URIC ACID; Future  - WESTERGREN SED RATE; Future  - DX-JOINT SURVEY-HANDS SINGLE VIEW; Future  - meloxicam (MOBIC) 7.5 MG Tab; Take 1 Tab by " mouth every day.  Dispense: 30 Tab; Refill: 1    2. Liver cyst  Patient apparently will be doing ultrasound for her gastroenterologist and then following up.    3. History of hepatitis C  Patient reports she is in remission.

## 2017-09-29 LAB — URATE SERPL-MCNC: 3.1 MG/DL (ref 1.9–8.2)

## 2017-09-30 LAB — CCP IGG SERPL-ACNC: 3 UNITS (ref 0–19)

## 2017-10-23 RX ORDER — LEVOTHYROXINE SODIUM 88 UG/1
TABLET ORAL
Qty: 90 TAB | Refills: 3 | Status: SHIPPED | OUTPATIENT
Start: 2017-10-23 | End: 2018-12-30 | Stop reason: SDUPTHER

## 2017-11-02 ENCOUNTER — HOSPITAL ENCOUNTER (OUTPATIENT)
Dept: RADIOLOGY | Facility: MEDICAL CENTER | Age: 57
End: 2017-11-02
Attending: SPECIALIST
Payer: COMMERCIAL

## 2017-11-02 DIAGNOSIS — B19.20 HEPATITIS C VIRUS INFECTION WITHOUT HEPATIC COMA: ICD-10-CM

## 2017-11-02 PROCEDURE — 76700 US EXAM ABDOM COMPLETE: CPT

## 2018-02-01 ENCOUNTER — OFFICE VISIT (OUTPATIENT)
Dept: MEDICAL GROUP | Facility: MEDICAL CENTER | Age: 58
End: 2018-02-01
Payer: COMMERCIAL

## 2018-02-01 VITALS
TEMPERATURE: 98.4 F | BODY MASS INDEX: 29.08 KG/M2 | RESPIRATION RATE: 16 BRPM | DIASTOLIC BLOOD PRESSURE: 72 MMHG | HEART RATE: 66 BPM | SYSTOLIC BLOOD PRESSURE: 118 MMHG | OXYGEN SATURATION: 97 % | HEIGHT: 62 IN | WEIGHT: 158 LBS

## 2018-02-01 DIAGNOSIS — H57.89 IRRITATION OF RIGHT EYE: ICD-10-CM

## 2018-02-01 PROCEDURE — 99213 OFFICE O/P EST LOW 20 MIN: CPT | Performed by: NURSE PRACTITIONER

## 2018-02-01 RX ORDER — CIPROFLOXACIN HYDROCHLORIDE 3.5 MG/ML
1 SOLUTION/ DROPS TOPICAL
Qty: 1 BOTTLE | Refills: 0 | Status: SHIPPED | OUTPATIENT
Start: 2018-02-01 | End: 2018-07-24

## 2018-02-01 ASSESSMENT — ENCOUNTER SYMPTOMS
EYE PAIN: 0
DOUBLE VISION: 0
PHOTOPHOBIA: 0
BLURRED VISION: 0
EYE DISCHARGE: 0
EYE REDNESS: 0

## 2018-02-01 NOTE — PROGRESS NOTES
Subjective:      Lyn Morris is a 57 y.o. female who presents with Eye Problem        CC: Patient here today for right eye irritation.    HPI Lyn Morris  states her symptoms started about 3 weeks ago. She states she had a pruritic area on the inner canthus of her right eye without any history of trauma or foreign body. She admits she tends to rub and scratch at the area. She has tried over-the-counter saline wash with limited success. She denies vision loss or eye pain. She admits she does touch the area frequently because it feels irritated and pruritic. She denies facial pain, fever, cough or sinus congestion. She admits she has not seen an optometrist in a while.      Social History   Substance Use Topics   • Smoking status: Never Smoker   • Smokeless tobacco: Never Used   • Alcohol use No     Current Outpatient Prescriptions   Medication Sig Dispense Refill   • ciprofloxacin (CILOXIN) 0.3 % Solution Place 1 Drop in right eye every 2 hours. 1 Bottle 0   • levothyroxine (SYNTHROID) 88 MCG Tab TAKE ONE TABLET BY MOUTH ONCE DAILY 90 Tab 3   • meloxicam (MOBIC) 7.5 MG Tab Take 1 Tab by mouth every day. 30 Tab 1   • fluticasone (FLONASE) 50 MCG/ACT nasal spray Spray 2 Sprays in nose every day. 16 g 11   • albuterol (VENTOLIN OR PROVENTIL) 108 (90 BASE) MCG/ACT AERS inhalation aerosol Inhale 2 Puffs by mouth every 6 hours as needed for Shortness of Breath. 8.5 g 3     No current facility-administered medications for this visit.      Family History   Problem Relation Age of Onset   • Cancer Sister      breast   • Hypertension Father    • Diabetes Father    • Hypertension Brother      Past Medical History:   Diagnosis Date   • Family history of diabetes mellitus    • Hepatitis C 2003    fibrosis, treated fall 2003, Dr. 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   All other systems reviewed and are negative.         Objective:     /72   Pulse 66   Temp 36.9 °C (98.4 °F)   Resp 16   Ht 1.575 m (5' 2\")   Wt 71.7 kg (158 lb)   LMP 12/04/2008   SpO2 97%   BMI 28.90 kg/m²      Physical Exam   Constitutional: She is oriented to person, place, and time. She appears well-developed and well-nourished. No distress.   HENT:   Head: Normocephalic and atraumatic.   Right Ear: External ear normal.   Left Ear: External ear normal.   Nose: Nose normal.   Eyes: Right eye exhibits no discharge. Left eye exhibits no discharge.   There appears to be some minor erythema and irritation on the inner canthus of the right eye. There is no conjunctival erythema or discharge. There is no periorbital edema or erythema.   Neck: Normal range of motion. Neck supple. No thyromegaly present.   Cardiovascular: Normal rate, regular rhythm and normal heart sounds.  Exam reveals no gallop and no friction rub.    No murmur heard.  Pulmonary/Chest: Effort normal and breath sounds normal. She has no wheezes. She has no rales.   Musculoskeletal: She exhibits no edema or tenderness.   Neurological: She is alert and oriented to person, place, and time. She displays normal reflexes.   Skin: Skin is warm and dry. No rash noted. She is not diaphoretic.   Psychiatric: She has a normal mood and affect. Her behavior is normal. Judgment and thought content normal.   Nursing note and vitals reviewed.              Assessment/Plan:     1. Irritation of right eye  I advised patient to stop rubbing and touching the area which may make healing slower. I will put her on some antibiotic eyedrops but I explained she has not seen an optometrist in a while and needs to make an appointment in the next week. She was advised to go to the emergency room if he developed vision loss or eye pain.  - ciprofloxacin (CILOXIN) 0.3 % Solution; Place 1 Drop in right eye every 2 hours.  Dispense: 1 Bottle; " Refill: 0

## 2018-02-15 ENCOUNTER — HOSPITAL ENCOUNTER (OUTPATIENT)
Dept: RADIOLOGY | Facility: MEDICAL CENTER | Age: 58
End: 2018-02-15
Attending: NURSE PRACTITIONER
Payer: COMMERCIAL

## 2018-02-15 DIAGNOSIS — Z12.31 VISIT FOR SCREENING MAMMOGRAM: ICD-10-CM

## 2018-02-15 PROCEDURE — 77063 BREAST TOMOSYNTHESIS BI: CPT

## 2018-04-05 ENCOUNTER — HOSPITAL ENCOUNTER (OUTPATIENT)
Dept: LAB | Facility: MEDICAL CENTER | Age: 58
End: 2018-04-05
Attending: INTERNAL MEDICINE
Payer: COMMERCIAL

## 2018-04-05 ENCOUNTER — HOSPITAL ENCOUNTER (OUTPATIENT)
Dept: RADIOLOGY | Facility: MEDICAL CENTER | Age: 58
End: 2018-04-05
Attending: INTERNAL MEDICINE
Payer: COMMERCIAL

## 2018-04-05 DIAGNOSIS — B18.2 CHRONIC HEPATITIS C WITH HEPATIC COMA (HCC): ICD-10-CM

## 2018-04-05 PROCEDURE — 82105 ALPHA-FETOPROTEIN SERUM: CPT

## 2018-04-05 PROCEDURE — 36415 COLL VENOUS BLD VENIPUNCTURE: CPT

## 2018-04-05 PROCEDURE — 76700 US EXAM ABDOM COMPLETE: CPT

## 2018-04-07 LAB — AFP-TM SERPL-MCNC: 3 NG/ML (ref 0–9)

## 2018-07-17 ENCOUNTER — APPOINTMENT (OUTPATIENT)
Dept: RADIOLOGY | Facility: MEDICAL CENTER | Age: 58
End: 2018-07-17
Attending: EMERGENCY MEDICINE
Payer: COMMERCIAL

## 2018-07-17 ENCOUNTER — HOSPITAL ENCOUNTER (EMERGENCY)
Facility: MEDICAL CENTER | Age: 58
End: 2018-07-17
Attending: EMERGENCY MEDICINE
Payer: COMMERCIAL

## 2018-07-17 VITALS
HEART RATE: 82 BPM | WEIGHT: 157 LBS | RESPIRATION RATE: 15 BRPM | TEMPERATURE: 97.3 F | BODY MASS INDEX: 30.82 KG/M2 | OXYGEN SATURATION: 92 % | HEIGHT: 60 IN

## 2018-07-17 DIAGNOSIS — E86.0 DEHYDRATION: ICD-10-CM

## 2018-07-17 DIAGNOSIS — R07.89 OTHER CHEST PAIN: ICD-10-CM

## 2018-07-17 DIAGNOSIS — R10.13 EPIGASTRIC PAIN: ICD-10-CM

## 2018-07-17 DIAGNOSIS — R11.2 NON-INTRACTABLE VOMITING WITH NAUSEA, UNSPECIFIED VOMITING TYPE: ICD-10-CM

## 2018-07-17 LAB
ALBUMIN SERPL BCP-MCNC: 4.4 G/DL (ref 3.2–4.9)
ALBUMIN/GLOB SERPL: 1.6 G/DL
ALP SERPL-CCNC: 94 U/L (ref 30–99)
ALT SERPL-CCNC: 22 U/L (ref 2–50)
ANION GAP SERPL CALC-SCNC: 17 MMOL/L (ref 0–11.9)
APPEARANCE UR: CLEAR
AST SERPL-CCNC: 36 U/L (ref 12–45)
BASOPHILS # BLD AUTO: 0.5 % (ref 0–1.8)
BASOPHILS # BLD: 0.04 K/UL (ref 0–0.12)
BILIRUB SERPL-MCNC: 0.5 MG/DL (ref 0.1–1.5)
BILIRUB UR QL STRIP.AUTO: NEGATIVE
BUN SERPL-MCNC: 22 MG/DL (ref 8–22)
CALCIUM SERPL-MCNC: 9 MG/DL (ref 8.5–10.5)
CHLORIDE SERPL-SCNC: 104 MMOL/L (ref 96–112)
CO2 SERPL-SCNC: 17 MMOL/L (ref 20–33)
COLOR UR: YELLOW
CREAT SERPL-MCNC: 0.48 MG/DL (ref 0.5–1.4)
EOSINOPHIL # BLD AUTO: 0.03 K/UL (ref 0–0.51)
EOSINOPHIL NFR BLD: 0.3 % (ref 0–6.9)
ERYTHROCYTE [DISTWIDTH] IN BLOOD BY AUTOMATED COUNT: 45.8 FL (ref 35.9–50)
GLOBULIN SER CALC-MCNC: 2.8 G/DL (ref 1.9–3.5)
GLUCOSE BLD-MCNC: 119 MG/DL (ref 65–99)
GLUCOSE SERPL-MCNC: 123 MG/DL (ref 65–99)
GLUCOSE UR STRIP.AUTO-MCNC: NEGATIVE MG/DL
HCT VFR BLD AUTO: 41.1 % (ref 37–47)
HGB BLD-MCNC: 14.5 G/DL (ref 12–16)
IMM GRANULOCYTES # BLD AUTO: 0.06 K/UL (ref 0–0.11)
IMM GRANULOCYTES NFR BLD AUTO: 0.7 % (ref 0–0.9)
INR PPP: 0.99 (ref 0.87–1.13)
KETONES UR STRIP.AUTO-MCNC: 40 MG/DL
LEUKOCYTE ESTERASE UR QL STRIP.AUTO: NEGATIVE
LIPASE SERPL-CCNC: 26 U/L (ref 11–82)
LYMPHOCYTES # BLD AUTO: 1.3 K/UL (ref 1–4.8)
LYMPHOCYTES NFR BLD: 15 % (ref 22–41)
MCH RBC QN AUTO: 34.1 PG (ref 27–33)
MCHC RBC AUTO-ENTMCNC: 35.3 G/DL (ref 33.6–35)
MCV RBC AUTO: 96.7 FL (ref 81.4–97.8)
MICRO URNS: ABNORMAL
MONOCYTES # BLD AUTO: 0.5 K/UL (ref 0–0.85)
MONOCYTES NFR BLD AUTO: 5.8 % (ref 0–13.4)
NEUTROPHILS # BLD AUTO: 6.76 K/UL (ref 2–7.15)
NEUTROPHILS NFR BLD: 77.7 % (ref 44–72)
NITRITE UR QL STRIP.AUTO: NEGATIVE
NRBC # BLD AUTO: 0 K/UL
NRBC BLD-RTO: 0 /100 WBC
PH UR STRIP.AUTO: 7.5 [PH]
PLATELET # BLD AUTO: 199 K/UL (ref 164–446)
PMV BLD AUTO: 10.6 FL (ref 9–12.9)
POTASSIUM SERPL-SCNC: 3.8 MMOL/L (ref 3.6–5.5)
PROT SERPL-MCNC: 7.2 G/DL (ref 6–8.2)
PROT UR QL STRIP: NEGATIVE MG/DL
PROTHROMBIN TIME: 12.8 SEC (ref 12–14.6)
RBC # BLD AUTO: 4.25 M/UL (ref 4.2–5.4)
RBC UR QL AUTO: NEGATIVE
SODIUM SERPL-SCNC: 138 MMOL/L (ref 135–145)
SP GR UR STRIP.AUTO: 1.02
TROPONIN I SERPL-MCNC: <0.01 NG/ML (ref 0–0.04)
TROPONIN I SERPL-MCNC: <0.01 NG/ML (ref 0–0.04)
UROBILINOGEN UR STRIP.AUTO-MCNC: 0.2 MG/DL
WBC # BLD AUTO: 8.7 K/UL (ref 4.8–10.8)

## 2018-07-17 PROCEDURE — 84484 ASSAY OF TROPONIN QUANT: CPT | Mod: 91

## 2018-07-17 PROCEDURE — 85025 COMPLETE CBC W/AUTO DIFF WBC: CPT

## 2018-07-17 PROCEDURE — 80053 COMPREHEN METABOLIC PANEL: CPT

## 2018-07-17 PROCEDURE — 83690 ASSAY OF LIPASE: CPT

## 2018-07-17 PROCEDURE — 36415 COLL VENOUS BLD VENIPUNCTURE: CPT

## 2018-07-17 PROCEDURE — 93005 ELECTROCARDIOGRAM TRACING: CPT | Performed by: EMERGENCY MEDICINE

## 2018-07-17 PROCEDURE — 71045 X-RAY EXAM CHEST 1 VIEW: CPT

## 2018-07-17 PROCEDURE — 82962 GLUCOSE BLOOD TEST: CPT

## 2018-07-17 PROCEDURE — 85610 PROTHROMBIN TIME: CPT

## 2018-07-17 PROCEDURE — 99284 EMERGENCY DEPT VISIT MOD MDM: CPT

## 2018-07-17 PROCEDURE — 81003 URINALYSIS AUTO W/O SCOPE: CPT

## 2018-07-17 PROCEDURE — 700105 HCHG RX REV CODE 258: Performed by: EMERGENCY MEDICINE

## 2018-07-17 RX ORDER — ONDANSETRON 4 MG/1
4 TABLET, ORALLY DISINTEGRATING ORAL EVERY 8 HOURS PRN
Qty: 10 TAB | Refills: 0 | Status: SHIPPED | OUTPATIENT
Start: 2018-07-17 | End: 2019-01-24

## 2018-07-17 RX ORDER — SODIUM CHLORIDE 9 MG/ML
1000 INJECTION, SOLUTION INTRAVENOUS ONCE
Status: COMPLETED | OUTPATIENT
Start: 2018-07-17 | End: 2018-07-17

## 2018-07-17 RX ADMIN — SODIUM CHLORIDE 1000 ML: 9 INJECTION, SOLUTION INTRAVENOUS at 14:22

## 2018-07-17 NOTE — ED NOTES
[facility] 1 Fall Risk Assessment Tool    Present to ED because of fall NO  (Syncope, seizure, or ALOC)  Age>70   NO  Altered Mental Status:  (Intoxicated with Alcohol or Substance Confusion,  Inability to follow instructions, disorientation)   NO  Impaired Mobility:  Ambulates or transfers with assistive devices or assist  Ambulates with unsteady gait and no assistance  Unable to ambulate or transfer  NO  Nursing Judgement  (Bowel or bladder incontinence, diarrhea, urinary   frequency or urgency, leg weakness, orthostatic   hypotension, dizziness or vertigo, narcotic use).   YES    Fall Risk Interventions    Move the patient closer to the nurse's station  Familiarize the patient with environment.  Place call light within reach and demonstrate call light use.  Keep patient's personal possessions within patient safe reach  (if appropriate.)  Place stretcher in low position and brakes locked  Place yellow socks and armband on patient  Place green triangle on patient's door  Give patient urinal if applicable  Keep floor surfaces clean and dry.  Keep patient care areas uncluttered.  Use a lap belt or poesy vest  Assess patient hourly for: Pain, Personal Needs, Position change, and call   light access

## 2018-07-17 NOTE — ED PROVIDER NOTES
"ED Provider Note    Scribed for Mesfin Brewer M.D. by Court Amos. 7/17/2018  11:18 AM    Primary care provider: BHARGAV Atwood  Means of arrival: ambulance   History obtained from: patient   History limited by: none     CHIEF COMPLAINT  Chief Complaint   Patient presents with   • Dizziness     pt states feeling \"sleepy, I got lightheaded and felt dingy\"   • N/V       MAGGI Morris is a 58 y.o. female who presents to the Emergency Department for evaluation of dizziness that she describes as a lightheadedness onset 80 minutes ago while working. She has associated fatigue, slight confusion and tingling to her arms. She had 2-3 episodes of nausea and vomiting following these initial symptoms and then experienced non radiating chest heaviness that lasted for a few minutes. She describes the quality of her chest discomfort as a heaviness that was relieved after vomiting. She denies shortness of breath, leg pain or swelling, diaphoresis, fever, cough. She was medicated with aspirin, nitro and zofran prior to arrival.  Patient denies history of hypertension, high cholesterol, diabetes, smoking and IV drug use. She has no history of heart disease. She also denies recent travel and sick contact.       REVIEW OF SYSTEMS  Pertinent positives include: dizziness, lightheadedness, nausea, vomiting, chest heaviness, fatigue, slight confusion, tingling to arms.  Pertinent negatives include: shortness of breath, leg pain, leg swelling, diaphoresis, fever, cough.  10+ systems reviewed and negative.  C.       PAST MEDICAL HISTORY  Past Medical History:   Diagnosis Date   • Family history of diabetes mellitus    • Hepatitis C 2003    fibrosis, treated fall 2003, Dr. 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       FAMILY HISTORY  Family History   Problem Relation Age of Onset   • Cancer Sister      breast   • Hypertension Father    • Diabetes Father    • " Hypertension Brother        SOCIAL HISTORY  Social History   Substance Use Topics   • Smoking status: Never Smoker   • Smokeless tobacco: Never Used   • Alcohol use No     History   Drug Use No       SURGICAL HISTORY  Past Surgical History:   Procedure Laterality Date   • VAGINAL HYSTERECTOMY TOTAL  12/11/2008    Performed by KATY CONN at SURGERY SAME DAY [facility]   • ANTERIOR AND POSTERIOR REPAIR  12/11/2008    Performed by KATY CONN at SURGERY SAME DAY [facility]   • COLONOSCOPY  2003    normal       CURRENT MEDICATIONS  Home Medications    **Home medications have not yet been reviewed for this encounter**         ALLERGIES  No Known Allergies      PHYSICAL EXAM  VITAL SIGNS: Pulse 71   Temp 36.3 °C (97.3 °F)   Resp 14   Ht 1.524 m (5')   Wt 71.2 kg (157 lb)   LMP 12/04/2008   SpO2 97%   BMI 30.66 kg/m²     Reviewed and normal  Constitutional: Well developed, Well nourished.  HENT: Normocephalic, atraumatic, bilateral external ears normal, oropharynx dry, No exudates or erythema.   Eyes: Pupils are 3 mm and normal, conjunctiva pink, no scleral icterus.   Cardiovascular: Regular rate and rhythm. No murmurs, rubs or gallops.   Respiratory: Lungs clear to auscultation bilaterally. No wheezes, rales, or rhonchi.   Abdominal:  Abdomen soft, non-tender, non distended. No rebound, or guarding.    Skin: No erythema, no rash.   Genitourinary: No costovertebral angle tenderness.   Musculoskeletal: no edema.   Neurologic: Alert & oriented x 3, cranial nerves 2-12 intact by passive exam.  No focal deficit noted.  Psychiatric: Affect normal, Judgment normal, Mood normal.           EKG Interpretation:  Interpreted by me  Rhythm:  Normal sinus rhythm   Rate: Normal  Axis: normal  Ectopy: none  Conduction: normal  ST Segments: no acute change  T Waves: no acute change  Q Waves: none  Clinical Impression: Normal EKG without acute changes       RADIOLOGY/PROCEDURES  DX-CHEST-LIMITED (1 VIEW)   Final Result          No acute cardiopulmonary abnormalities are identified.      Radiologist interpretation have been reviewed by me.         LABORATORY:  Results for orders placed or performed during the hospital encounter of 07/17/18   CBC WITH DIFFERENTIAL   Result Value Ref Range    WBC 8.7 4.8 - 10.8 K/uL    RBC 4.25 4.20 - 5.40 M/uL    Hemoglobin 14.5 12.0 - 16.0 g/dL    Hematocrit 41.1 37.0 - 47.0 %    MCV 96.7 81.4 - 97.8 fL    MCH 34.1 (H) 27.0 - 33.0 pg    MCHC 35.3 (H) 33.6 - 35.0 g/dL    RDW 45.8 35.9 - 50.0 fL    Platelet Count 199 164 - 446 K/uL    MPV 10.6 9.0 - 12.9 fL    Neutrophils-Polys 77.70 (H) 44.00 - 72.00 %    Lymphocytes 15.00 (L) 22.00 - 41.00 %    Monocytes 5.80 0.00 - 13.40 %    Eosinophils 0.30 0.00 - 6.90 %    Basophils 0.50 0.00 - 1.80 %    Immature Granulocytes 0.70 0.00 - 0.90 %    Nucleated RBC 0.00 /100 WBC    Neutrophils (Absolute) 6.76 2.00 - 7.15 K/uL    Lymphs (Absolute) 1.30 1.00 - 4.80 K/uL    Monos (Absolute) 0.50 0.00 - 0.85 K/uL    Eos (Absolute) 0.03 0.00 - 0.51 K/uL    Baso (Absolute) 0.04 0.00 - 0.12 K/uL    Immature Granulocytes (abs) 0.06 0.00 - 0.11 K/uL    NRBC (Absolute) 0.00 K/uL   COMP METABOLIC PANEL   Result Value Ref Range    Bun 22 8 - 22 mg/dL    Sodium 138 135 - 145 mmol/L    Potassium 3.8 3.6 - 5.5 mmol/L    Chloride 104 96 - 112 mmol/L    Co2 17 (L) 20 - 33 mmol/L    Anion Gap 17.0 (H) 0.0 - 11.9    Glucose 123 (H) 65 - 99 mg/dL    Creatinine 0.48 (L) 0.50 - 1.40 mg/dL    Calcium 9.0 8.5 - 10.5 mg/dL    AST(SGOT) 36 12 - 45 U/L    ALT(SGPT) 22 2 - 50 U/L    Alkaline Phosphatase 94 30 - 99 U/L    Total Bilirubin 0.5 0.1 - 1.5 mg/dL    Albumin 4.4 3.2 - 4.9 g/dL    Total Protein 7.2 6.0 - 8.2 g/dL    Globulin 2.8 1.9 - 3.5 g/dL    A-G Ratio 1.6 g/dL   LIPASE   Result Value Ref Range    Lipase 26 11 - 82 U/L   TROPONIN   Result Value Ref Range    Troponin I <0.01 0.00 - 0.04 ng/mL   PROTHROMBIN TIME   Result Value Ref Range    PT 12.8 12.0 - 14.6 sec    INR 0.99 0.87 -  1.13   TROPONIN   Result Value Ref Range    Troponin I <0.01 0.00 - 0.04 ng/mL   URINALYSIS,CULTURE IF INDICATED   Result Value Ref Range    Color Yellow     Character Clear     Specific Gravity 1.021 <1.035    Ph 7.5 5.0 - 8.0    Glucose Negative Negative mg/dL    Ketones 40 (A) Negative mg/dL    Protein Negative Negative mg/dL    Bilirubin Negative Negative    Urobilinogen, Urine 0.2 Negative    Nitrite Negative Negative    Leukocyte Esterase Negative Negative    Occult Blood Negative Negative    Micro Urine Req see below    ACCU-CHEK GLUCOSE   Result Value Ref Range    Glucose - Accu-Ck 119 (H) 65 - 99 mg/dL   Lab results reviewed by me.         INTERVENTIONS:  Patient was treated with IV fluids secondary to dry mucous membranes  Medications   NS infusion 1,000 mL (0 mL Intravenous Stopped 7/17/18 1529)   Response: Patient has improved hydration on reassessment    ED COURSE:    11:18 AM - Patient seen and examined at bedside. Patient will be treated with IV fluids secondary to dry mucous membranes for her symptoms. Ordered DX chest, UA, troponin, CBC, CMP, lipase, troponin, PTT, estimated GFR and EKG to evaluate.     11:25 AM [facility] which was [facility].     3:03 PM Patient reevaluated at bedside. Patient appears improved following IV fluids.    MEDICAL DECISION MAKING:  Well-appearing patient presents with nausea vomiting tingling and dizziness.  She had less than a minute of chest discomfort associated with these symptoms.  Patient had mild dehydration based on dry mucous membranes and low CO2 on basic metabolic panel.  At this point there is no evidence of myocardial ischemia.  There is no evidence of acute abdominal process although the patient has mild upper abdominal pain.  She may have a viral syndrome, food poisoning, gastritis or peptic ulcer disease.  At this point there is no indication for further testing or admission for observation    PLAN:  Discharge Medication List as of  7/17/2018  3:32 PM      START taking these medications    Details   ondansetron (ZOFRAN ODT) 4 MG TABLET DISPERSIBLE Take 1 Tab by mouth every 8 hours as needed., Disp-10 Tab, R-0, Print Rx Paper           Oral rehydration  Abdominal pain handout given  Return for chest pain lasting longer than 10-15 minutes, worsening abdominal pain, uncontrolled vomiting, dizziness, GI bleed    Janak Hein, A.P.N.  [address]  [address]  [facility] [id number] [phone]    Schedule an appointment as soon as possible for a visit  As needed if not better 3 days      CONDITION: Stable, improved.     FINAL IMPRESSION  1. Other chest pain    2. Epigastric pain    3. Non-intractable vomiting with nausea, unspecified vomiting type    4. Dehydration           Court JACOB (Scribginette), am scribing for, and in the presence of, Mesfin Brewer M.D..  Electronically signed by: Court Amos (Karol), 7/17/2018  Mesfin JACOB M.D. personally performed the services described in this documentation, as scribed by Court Amos in my presence, and it is both accurate and complete.    The note accurately reflects work and decisions made by me.  Mesfin Brewer  7/18/2018  10:55 AM

## 2018-07-17 NOTE — ED NOTES
Pt assisted to BR via WC reports +nausea with moving only, pt denies any other complaints at this time.

## 2018-07-17 NOTE — DISCHARGE INSTRUCTIONS
Abdominal Pain, Extended Version   Belly Pain, Stomach Pain    Take a clear fluid diet 12 hours and use Tylenol for pain.  Return to emergency department for chest pain lasting longer than 15 minutes or abdominal pain lasting more than 2 hours.  Return to the ED sooner for worsening pain (especially in the lower right abdomen), pain that is worse with movement, uncontrolled vomiting, new fever, bleeding or ill appearance.  Take Zofran for nausea.    You had an elevated or high normal blood pressure reading today.  This does not necessarily mean you have hypertension.  Please followup with your/a primary physician for comprehensive blood pressure evaluation.  BP Readings from Last 3 Encounters:   02/01/18 118/72   09/28/17 130/80   07/25/17 124/76         Your exam may not have shown the exact reason you have abdominal pain.  Since there are many different causes of abdominal pain, another checkup and more tests may be needed. One of the many possible causes of abdominal pain in any person who has not had their appendix removed is Acute Appendicitis.  Appendicitis is often a very difficult to diagnosis. Normal blood tests, urine tests, and even ultrasound and CT can not ensure there is not an early appendicitis. Sometimes only the changes which occur over time will allow appendicitis and other causes of abdominal pain to be determined.  Because of this, it is important you follow all of the instructions below.                                                                                                Home care instructions  Ø Rest.  Ø Do not eat solid food until your pain is gone.  Ø While You Have Pain:  Stay on a clear liquid diet.  A clear liquid is one you can see through (water, weak tea, broth or bouillon, ginger ale, Jell-o, Diego-Aid, Gatorade, apple juice, popsicles or ice chips).   Ø When Your Pain is Gone:  Start a light diet (dry toast, crackers, applesauce, white rice, bananas, broth or bouillon) and  increase the diet slowly, as long as it does not bother you.  No dairy products (including cheese and eggs) and no spicy, fatty, fried or high fiber foods.  Ø No alcohol, caffeine or cigarettes.  Ø Take your regular medicines unless the caregiver told you not to.  Ø Take any prescribed medicine as directed.  Ø Do not take medicine containing aspirin, ibuprofen (Advil® / Motrin® ), naprosyn/naproxen (Aleve®) or ketoprofen (Orudis® ) unless told to by your own caregiver.    seek immediate medical attention if :  Ø Your pain is not gone in 8-12 hours.  Ø Your pain becomes worse, changes location or feels different.  Ø You have a fever or shaking chills.  Ø You keep throwing up or cannot drink liquids.   Ø You see blood when you throw up or see blood in your bowel movements.    Ø Your bowel movements become dark or black.  Ø You move your bowels frequently.  Ø Your bowel movements stop (become blocked) or you cannot pass gas.  Ø You have bloody, frequent or painful urination (“passing water”).  Ø Your skin or the whites of your eyes look yellow.  Ø Your stomach becomes bloated or bigger.  Ø You notice bleeding or discharge from your vagina.  Ø You have dizziness or fainting.  Ø You have chest or back pain.   Ø Anything else worries you.  Ø You become short of breath.    If you have questions or concerns, please call your caregiver.     Adapted from ©2001  [employer] Aftercare Instruction Sheets  Last reviewed July 12, 2005, [employer], creator of [employer] Patient Information System.    [employer]Care® Patient Information ©2007 [employer].

## 2018-07-17 NOTE — ED TRIAGE NOTES
"Chief Complaint   Patient presents with   • Dizziness     pt states feeling \"sleepy, I got lightheaded and felt dingy\"   • N/V   pt BIB [facility] pt given 324mg ASA, 0.4mg Nitro, and 4mg Zofran, pt arrived to ED A&O +nausea, erp is at bs  "

## 2018-07-17 NOTE — ED NOTES
IV DC'd with cath intact, site bandaged, bleeding controlled.   Pt verbalized understanding of DC and med instructions, pt with no further questions. Pt ambulate out of ED with steady gait.

## 2018-07-18 ENCOUNTER — PATIENT OUTREACH (OUTPATIENT)
Dept: HEALTH INFORMATION MANAGEMENT | Facility: OTHER | Age: 58
End: 2018-07-18

## 2018-07-22 LAB — EKG IMPRESSION: NORMAL

## 2018-07-24 ENCOUNTER — OFFICE VISIT (OUTPATIENT)
Dept: MEDICAL GROUP | Facility: CLINIC | Age: 58
End: 2018-07-24
Payer: COMMERCIAL

## 2018-07-24 ENCOUNTER — HOSPITAL ENCOUNTER (OUTPATIENT)
Dept: LAB | Facility: MEDICAL CENTER | Age: 58
End: 2018-07-24
Attending: NURSE PRACTITIONER
Payer: COMMERCIAL

## 2018-07-24 VITALS
SYSTOLIC BLOOD PRESSURE: 104 MMHG | HEART RATE: 65 BPM | BODY MASS INDEX: 29.08 KG/M2 | RESPIRATION RATE: 14 BRPM | OXYGEN SATURATION: 95 % | TEMPERATURE: 97.4 F | WEIGHT: 158 LBS | HEIGHT: 62 IN | DIASTOLIC BLOOD PRESSURE: 60 MMHG

## 2018-07-24 DIAGNOSIS — R42 VERTIGO: ICD-10-CM

## 2018-07-24 DIAGNOSIS — R42 LIGHTHEADEDNESS: ICD-10-CM

## 2018-07-24 DIAGNOSIS — R11.0 NAUSEA: ICD-10-CM

## 2018-07-24 LAB
ALBUMIN SERPL BCP-MCNC: 4.4 G/DL (ref 3.2–4.9)
ALBUMIN/GLOB SERPL: 1.4 G/DL
ALP SERPL-CCNC: 106 U/L (ref 30–99)
ALT SERPL-CCNC: 14 U/L (ref 2–50)
ANION GAP SERPL CALC-SCNC: 6 MMOL/L (ref 0–11.9)
AST SERPL-CCNC: 18 U/L (ref 12–45)
BASOPHILS # BLD AUTO: 1.2 % (ref 0–1.8)
BASOPHILS # BLD: 0.06 K/UL (ref 0–0.12)
BILIRUB SERPL-MCNC: 0.6 MG/DL (ref 0.1–1.5)
BUN SERPL-MCNC: 15 MG/DL (ref 8–22)
CALCIUM SERPL-MCNC: 9.5 MG/DL (ref 8.5–10.5)
CHLORIDE SERPL-SCNC: 105 MMOL/L (ref 96–112)
CO2 SERPL-SCNC: 28 MMOL/L (ref 20–33)
CREAT SERPL-MCNC: 0.71 MG/DL (ref 0.5–1.4)
EOSINOPHIL # BLD AUTO: 0.05 K/UL (ref 0–0.51)
EOSINOPHIL NFR BLD: 1 % (ref 0–6.9)
ERYTHROCYTE [DISTWIDTH] IN BLOOD BY AUTOMATED COUNT: 47.3 FL (ref 35.9–50)
GLOBULIN SER CALC-MCNC: 3.1 G/DL (ref 1.9–3.5)
GLUCOSE SERPL-MCNC: 97 MG/DL (ref 65–99)
HCT VFR BLD AUTO: 44.3 % (ref 37–47)
HGB BLD-MCNC: 14.9 G/DL (ref 12–16)
IMM GRANULOCYTES # BLD AUTO: 0.02 K/UL (ref 0–0.11)
IMM GRANULOCYTES NFR BLD AUTO: 0.4 % (ref 0–0.9)
LYMPHOCYTES # BLD AUTO: 1.56 K/UL (ref 1–4.8)
LYMPHOCYTES NFR BLD: 30.3 % (ref 22–41)
MCH RBC QN AUTO: 33.5 PG (ref 27–33)
MCHC RBC AUTO-ENTMCNC: 33.6 G/DL (ref 33.6–35)
MCV RBC AUTO: 99.6 FL (ref 81.4–97.8)
MONOCYTES # BLD AUTO: 0.52 K/UL (ref 0–0.85)
MONOCYTES NFR BLD AUTO: 10.1 % (ref 0–13.4)
NEUTROPHILS # BLD AUTO: 2.94 K/UL (ref 2–7.15)
NEUTROPHILS NFR BLD: 57 % (ref 44–72)
NRBC # BLD AUTO: 0 K/UL
NRBC BLD-RTO: 0 /100 WBC
PLATELET # BLD AUTO: 215 K/UL (ref 164–446)
PMV BLD AUTO: 11.1 FL (ref 9–12.9)
POTASSIUM SERPL-SCNC: 4.3 MMOL/L (ref 3.6–5.5)
PROT SERPL-MCNC: 7.5 G/DL (ref 6–8.2)
RBC # BLD AUTO: 4.45 M/UL (ref 4.2–5.4)
SODIUM SERPL-SCNC: 139 MMOL/L (ref 135–145)
TSH SERPL DL<=0.005 MIU/L-ACNC: 2.23 UIU/ML (ref 0.38–5.33)
WBC # BLD AUTO: 5.2 K/UL (ref 4.8–10.8)

## 2018-07-24 PROCEDURE — 85025 COMPLETE CBC W/AUTO DIFF WBC: CPT

## 2018-07-24 PROCEDURE — 36415 COLL VENOUS BLD VENIPUNCTURE: CPT

## 2018-07-24 PROCEDURE — 99214 OFFICE O/P EST MOD 30 MIN: CPT | Performed by: NURSE PRACTITIONER

## 2018-07-24 PROCEDURE — 80053 COMPREHEN METABOLIC PANEL: CPT

## 2018-07-24 PROCEDURE — 84443 ASSAY THYROID STIM HORMONE: CPT

## 2018-07-24 RX ORDER — MECLIZINE HYDROCHLORIDE 25 MG/1
25 TABLET ORAL 3 TIMES DAILY PRN
Qty: 30 TAB | Refills: 0 | Status: SHIPPED | OUTPATIENT
Start: 2018-07-24 | End: 2019-01-24

## 2018-07-24 ASSESSMENT — PATIENT HEALTH QUESTIONNAIRE - PHQ9: CLINICAL INTERPRETATION OF PHQ2 SCORE: 0

## 2018-07-24 NOTE — PROGRESS NOTES
"CC: Nausea (nausea and vomiting which she went to ER for on . Took antinausea med this morning @ 7:45 but states something doesn't feel right in head and doesnt feel well)        HPI:     Lyn is a [facility] patient, all problems are new to me, presents today for the followin. Lightheadedness/Nausea  Here today following up from an ER visit on .  She was transported there from her [facility] because she was having a weird sensation in her head.  She describes this as feeling like things are moving \"too fast\".  She was evaluated in the ER but due to having a complaint of some chest pain she is mostly evaluated for cardiac, which was normal.  She states during over the last couple weeks she has had episodes of vivienne vertigo where the room spins.  This occurs whenever she bends over or turns side to side in bed.  She has a large amount of difficulty describing what that \"things moving too fast\" sensations like.  When asked if it could be a milder form of vertigo she says that is possible.  They both usually associated with nausea.  Currently the symptoms are milder.  She does not report any chest pain or other symptoms associated with it.  No association with head trauma or other symptoms.  She is here today because she states that work she started to feel that sensation again.  Took a Zofran and the symptoms and symptoms resolved.      Current Outpatient Prescriptions   Medication Sig Dispense Refill   • meclizine (ANTIVERT) 25 MG Tab Take 1 Tab by mouth 3 times a day as needed. 30 Tab 0   • ondansetron (ZOFRAN ODT) 4 MG TABLET DISPERSIBLE Take 1 Tab by mouth every 8 hours as needed. 10 Tab 0   • levothyroxine (SYNTHROID) 88 MCG Tab TAKE ONE TABLET BY MOUTH ONCE DAILY 90 Tab 3   • fluticasone (FLONASE) 50 MCG/ACT nasal spray Spray 2 Sprays in nose every day. 16 g 11   • albuterol (VENTOLIN OR PROVENTIL) 108 (90 BASE) MCG/ACT AERS inhalation aerosol Inhale 2 Puffs by mouth every 6 hours as needed for " "Shortness of Breath. 8.5 g 3     No current facility-administered medications for this visit.      Social History   Substance Use Topics   • Smoking status: Never Smoker   • Smokeless tobacco: Never Used   • Alcohol use No     I reviewed patients allergies, problem list and medications today in EPIC.    ROS: Any/all pertinent positives listed in the HPI, otherwise all others reviewed are negative today.      /60   Pulse 65   Temp 36.3 °C (97.4 °F)   Resp 14   Ht 1.575 m (5' 2\")   Wt 71.7 kg (158 lb)   LMP 12/04/2008   SpO2 95%   BMI 28.90 kg/m²     Exam:    Gen: Alert and oriented, No apparent distress. WDWN  Psych: A+Ox3, normal affect and mood  Skin: Warm, dry and intact. Good turgor   No rashes in visible areas.  Eye: Conjunctiva clear, lids normal  ENMT: Lips without lesions, good dentition   Oropharynx clear.  TMs unremarkable bilaterally.  Neck: No Lymphadenopathy, Thyromegaly, Bruits.   Trachea midline, no masses  Lungs: Clear to auscultation bilaterally, no rales or rhonchi   Unlabored respiratory effort.   CV: Regular rate and rhythm, S1, S2. No murmurs.   No Edema  Neuro:    CNs: II:PERRLA, III/IV/VI EOM without ptosis or nystagmus, V motor intact, VII facial movements without asymmetry or weakness, iX/X palate midline, XI Neck strength intact, XII tongue protuded midline.  Motor: Strength noted 5/5 upper and lower bilateral extremities with normal tone.  No noted atrophy or deformity of motion.  Coordination: No dysmetria with rapid movements, \"finger-to-nose-to-finger\" with ease  Gait: Normal gait  Cerebellar signs: Absent  Tremors : Absent  Romberg normal    Assessment and Plan.   58 y.o. female with the following issues.    1. Lightheadedness  Suspect this is a milder form of vertigo.  We will try meclizine, reviewed caution for sedation and may start with half.  May take up to a whole tab 3 times a day.  I will order MRI as well.  Labs ordered as below.  - CBC WITH DIFFERENTIAL; " Future  - TSH; Future  - COMP METABOLIC PANEL; Future  - MR-BRAIN-W/O; Future  - meclizine (ANTIVERT) 25 MG Tab; Take 1 Tab by mouth 3 times a day as needed.  Dispense: 30 Tab; Refill: 0    2. Vertigo  Stable.  See above.  She was also given some floor exercises to help with BPPV.  - MR-BRAIN-W/O; Future    3. Nausea  May continue Zofran as needed

## 2018-08-04 ENCOUNTER — HOSPITAL ENCOUNTER (OUTPATIENT)
Dept: RADIOLOGY | Facility: MEDICAL CENTER | Age: 58
End: 2018-08-04
Attending: NURSE PRACTITIONER
Payer: COMMERCIAL

## 2018-08-04 DIAGNOSIS — R42 VERTIGO: ICD-10-CM

## 2018-08-04 DIAGNOSIS — R42 LIGHTHEADEDNESS: ICD-10-CM

## 2018-08-04 PROCEDURE — 70551 MRI BRAIN STEM W/O DYE: CPT

## 2018-09-27 ENCOUNTER — HOSPITAL ENCOUNTER (OUTPATIENT)
Dept: LAB | Facility: MEDICAL CENTER | Age: 58
End: 2018-09-27
Attending: INTERNAL MEDICINE
Payer: COMMERCIAL

## 2018-09-27 PROCEDURE — 36415 COLL VENOUS BLD VENIPUNCTURE: CPT

## 2018-09-27 PROCEDURE — 82105 ALPHA-FETOPROTEIN SERUM: CPT

## 2018-09-29 LAB — AFP-TM SERPL-MCNC: 2 NG/ML (ref 0–9)

## 2018-10-11 ENCOUNTER — HOSPITAL ENCOUNTER (OUTPATIENT)
Dept: RADIOLOGY | Facility: MEDICAL CENTER | Age: 58
End: 2018-10-11
Attending: INTERNAL MEDICINE
Payer: COMMERCIAL

## 2018-10-11 DIAGNOSIS — B19.20 HEPATITIS C VIRUS INFECTION WITHOUT HEPATIC COMA, UNSPECIFIED CHRONICITY: ICD-10-CM

## 2018-10-11 DIAGNOSIS — K76.89 LIVER CYST: ICD-10-CM

## 2018-10-11 DIAGNOSIS — B18.2 CHRONIC HEPATITIS C WITHOUT HEPATIC COMA (HCC): ICD-10-CM

## 2018-10-11 PROCEDURE — 76700 US EXAM ABDOM COMPLETE: CPT

## 2018-12-31 RX ORDER — LEVOTHYROXINE SODIUM 88 UG/1
TABLET ORAL
Qty: 90 TAB | Refills: 0 | Status: SHIPPED | OUTPATIENT
Start: 2018-12-31 | End: 2019-01-24

## 2019-01-03 RX ORDER — LEVOTHYROXINE SODIUM 88 UG/1
TABLET ORAL
Qty: 90 TAB | Refills: 3 | Status: SHIPPED | OUTPATIENT
Start: 2019-01-03 | End: 2020-02-18

## 2019-01-10 ENCOUNTER — HOSPITAL ENCOUNTER (OUTPATIENT)
Dept: LAB | Facility: MEDICAL CENTER | Age: 59
End: 2019-01-10
Attending: INTERNAL MEDICINE
Payer: COMMERCIAL

## 2019-01-10 LAB — PLATELET # BLD AUTO: 222 K/UL (ref 164–446)

## 2019-01-10 PROCEDURE — 84450 TRANSFERASE (AST) (SGOT): CPT

## 2019-01-10 PROCEDURE — 84460 ALANINE AMINO (ALT) (SGPT): CPT

## 2019-01-10 PROCEDURE — 83883 ASSAY NEPHELOMETRY NOT SPEC: CPT

## 2019-01-10 PROCEDURE — 85049 AUTOMATED PLATELET COUNT: CPT

## 2019-01-10 PROCEDURE — 36415 COLL VENOUS BLD VENIPUNCTURE: CPT

## 2019-01-10 PROCEDURE — 84520 ASSAY OF UREA NITROGEN: CPT

## 2019-01-10 PROCEDURE — 82977 ASSAY OF GGT: CPT

## 2019-01-14 LAB
A2 MACROGLOB SERPL-MCNC: 190 MG/DL (ref 131–293)
ALT SERPL-CCNC: 24 U/L (ref 5–40)
ANNOTATION COMMENT IMP: NORMAL
AST SERPL-CCNC: 25 U/L (ref 9–40)
BUN SERPL-SCNC: 16 MG/DL (ref 7–20)
CIRRHOMETER PT SCORE Q4850: 0.01
FIBROSIS STAGE SERPL QL: NORMAL
GGT SERPL-CCNC: 16 U/L (ref 7–33)
INFLAMETER META CLASS Q4853: NORMAL
INFLAMETER PT SCORE Q4852: 0.22
LIVER FIBR SCORE SERPL CALC.FIBROMETER: 0.21
PATHOLOGY STUDY: NORMAL
PROTHROM ACT/NOR PPP: 103 % (ref 90–120)

## 2019-01-21 ENCOUNTER — HOSPITAL ENCOUNTER (EMERGENCY)
Facility: MEDICAL CENTER | Age: 59
End: 2019-01-21
Attending: EMERGENCY MEDICINE
Payer: COMMERCIAL

## 2019-01-21 VITALS
BODY MASS INDEX: 30.25 KG/M2 | DIASTOLIC BLOOD PRESSURE: 83 MMHG | HEIGHT: 60 IN | TEMPERATURE: 97.7 F | OXYGEN SATURATION: 96 % | SYSTOLIC BLOOD PRESSURE: 157 MMHG | WEIGHT: 154.1 LBS | HEART RATE: 80 BPM | RESPIRATION RATE: 16 BRPM

## 2019-01-21 DIAGNOSIS — R10.9 ACUTE LEFT FLANK PAIN: ICD-10-CM

## 2019-01-21 LAB
ALBUMIN SERPL BCP-MCNC: 4.4 G/DL (ref 3.2–4.9)
ALBUMIN/GLOB SERPL: 1.2 G/DL
ALP SERPL-CCNC: 115 U/L (ref 30–99)
ALT SERPL-CCNC: 17 U/L (ref 2–50)
ANION GAP SERPL CALC-SCNC: 7 MMOL/L (ref 0–11.9)
APPEARANCE UR: CLEAR
AST SERPL-CCNC: 19 U/L (ref 12–45)
BASOPHILS # BLD AUTO: 1.2 % (ref 0–1.8)
BASOPHILS # BLD: 0.06 K/UL (ref 0–0.12)
BILIRUB SERPL-MCNC: 0.7 MG/DL (ref 0.1–1.5)
BILIRUB UR QL STRIP.AUTO: NEGATIVE
BUN SERPL-MCNC: 10 MG/DL (ref 8–22)
CALCIUM SERPL-MCNC: 9.7 MG/DL (ref 8.5–10.5)
CHLORIDE SERPL-SCNC: 103 MMOL/L (ref 96–112)
CO2 SERPL-SCNC: 26 MMOL/L (ref 20–33)
COLOR UR: YELLOW
CREAT SERPL-MCNC: 0.64 MG/DL (ref 0.5–1.4)
EOSINOPHIL # BLD AUTO: 0.05 K/UL (ref 0–0.51)
EOSINOPHIL NFR BLD: 1 % (ref 0–6.9)
ERYTHROCYTE [DISTWIDTH] IN BLOOD BY AUTOMATED COUNT: 45.4 FL (ref 35.9–50)
GLOBULIN SER CALC-MCNC: 3.7 G/DL (ref 1.9–3.5)
GLUCOSE SERPL-MCNC: 100 MG/DL (ref 65–99)
GLUCOSE UR STRIP.AUTO-MCNC: NEGATIVE MG/DL
HCT VFR BLD AUTO: 43 % (ref 37–47)
HGB BLD-MCNC: 14.8 G/DL (ref 12–16)
IMM GRANULOCYTES # BLD AUTO: 0.01 K/UL (ref 0–0.11)
IMM GRANULOCYTES NFR BLD AUTO: 0.2 % (ref 0–0.9)
KETONES UR STRIP.AUTO-MCNC: ABNORMAL MG/DL
LEUKOCYTE ESTERASE UR QL STRIP.AUTO: NEGATIVE
LIPASE SERPL-CCNC: 13 U/L (ref 11–82)
LYMPHOCYTES # BLD AUTO: 1.44 K/UL (ref 1–4.8)
LYMPHOCYTES NFR BLD: 27.9 % (ref 22–41)
MCH RBC QN AUTO: 33.2 PG (ref 27–33)
MCHC RBC AUTO-ENTMCNC: 34.4 G/DL (ref 33.6–35)
MCV RBC AUTO: 96.4 FL (ref 81.4–97.8)
MICRO URNS: ABNORMAL
MONOCYTES # BLD AUTO: 0.83 K/UL (ref 0–0.85)
MONOCYTES NFR BLD AUTO: 16.1 % (ref 0–13.4)
NEUTROPHILS # BLD AUTO: 2.77 K/UL (ref 2–7.15)
NEUTROPHILS NFR BLD: 53.6 % (ref 44–72)
NITRITE UR QL STRIP.AUTO: NEGATIVE
NRBC # BLD AUTO: 0 K/UL
NRBC BLD-RTO: 0 /100 WBC
PH UR STRIP.AUTO: 5 [PH]
PLATELET # BLD AUTO: 215 K/UL (ref 164–446)
PMV BLD AUTO: 10.3 FL (ref 9–12.9)
POTASSIUM SERPL-SCNC: 3.4 MMOL/L (ref 3.6–5.5)
PROT SERPL-MCNC: 8.1 G/DL (ref 6–8.2)
PROT UR QL STRIP: NEGATIVE MG/DL
RBC # BLD AUTO: 4.46 M/UL (ref 4.2–5.4)
RBC UR QL AUTO: NEGATIVE
SODIUM SERPL-SCNC: 136 MMOL/L (ref 135–145)
SP GR UR STRIP.AUTO: 1.01
UROBILINOGEN UR STRIP.AUTO-MCNC: 0.2 MG/DL
WBC # BLD AUTO: 5.2 K/UL (ref 4.8–10.8)

## 2019-01-21 PROCEDURE — 83690 ASSAY OF LIPASE: CPT

## 2019-01-21 PROCEDURE — 81003 URINALYSIS AUTO W/O SCOPE: CPT

## 2019-01-21 PROCEDURE — 85025 COMPLETE CBC W/AUTO DIFF WBC: CPT

## 2019-01-21 PROCEDURE — 80053 COMPREHEN METABOLIC PANEL: CPT

## 2019-01-21 PROCEDURE — 99284 EMERGENCY DEPT VISIT MOD MDM: CPT

## 2019-01-21 ASSESSMENT — PAIN DESCRIPTION - DESCRIPTORS: DESCRIPTORS: ACHING

## 2019-01-21 ASSESSMENT — PAIN SCALES - GENERAL: PAINLEVEL_OUTOF10: 4

## 2019-01-21 NOTE — ED TRIAGE NOTES
"Lyn Morris 58 y.o. female ambulatory to triage for     Chief Complaint   Patient presents with   • Abdominal Pain     LLQ abdominal pain starting Saturday waking patient at night.  Pt reports pain moved to LUQ yesterday and now across lower abdomen \"and feels bruised\".     Pt denies n/v/d, fever, or any other complaints.  States pain is constant and worsens with walking.  Pt was able to guard abd and place manual pressure and it relieved some of the pain.  /82   Pulse 86   Temp (!) 35.8 °C (96.4 °F) (Temporal)   Resp 16   Ht 1.524 m (5')   Wt 69.9 kg (154 lb 1.6 oz)   LMP 12/04/2008   SpO2 98%   BMI 30.10 kg/m²   Directed to the lobby to await bed assignment.  Advised to return to the triage desk for any changes/concerns.  "

## 2019-01-21 NOTE — ED NOTES
Discharge orders received from ERP. OTC med admin instructions given. Provided education on discharge instructions and diagnosis.Pt demonstrated understanding of education. Pt verbalized understanding of need for follow up appointment.  Pt ambulatory off unit with all personal belongings.

## 2019-01-21 NOTE — ED PROVIDER NOTES
"ED Provider Note    Scribed for Jacki Arreola M.D. by Jamshid Reyez. 1/21/2019, 2:07 PM.    Primary care provider: BHARGAV Atwood  Means of arrival: Private Vehicle  History obtained from: Patient  History limited by: None    CHIEF COMPLAINT  Chief Complaint   Patient presents with   • Abdominal Pain     LLQ abdominal pain starting Saturday waking patient at night.  Pt reports pain moved to LUQ yesterday and now across lower abdomen \"and feels bruised\".     HPI  Lyn Morris is a 58 y.o. female who presents to the Emergency Department complaining of left lower quadrant abdominal pain onset 2 days ago.  The patient states her pain increased today and she came in.  This is described as pressure and muscle ache that radiates to the left flank and across the low abdomen.  She rates this 4/10.  She denies hematuria, abnormal smelling urine, dysuria, vaginal symptoms or vaginal discharge. She had a normal bowel movement today with normal stool, and notes there is no chance that she is pregnant.  There is no history current alcohol use and she had a normal colonoscopy in 2016. Her past medical history includes hepatits C and hypothyroid. There is a family history of colon cancer    REVIEW OF SYSTEMS  Pertinent positives include abdominal pain and left flank pain. Pertinent negatives include no hematuria, dysuria, vaginal symptoms, vaginal discharge. As above, all other systems reviewed and are negative.   See HPI for further details.     PAST MEDICAL HISTORY  Past Medical History:   Diagnosis Date   • Family history of diabetes mellitus    • Hepatitis C 2003    fibrosis, treated fall 2003, Dr. 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 ORS   • COLONOSCOPY  2003    normal     SOCIAL HISTORY  Social History   Substance Use Topics   • Smoking status: Never Smoker   • Smokeless tobacco: Never Used   • Alcohol use No      History   Drug Use No     FAMILY HISTORY  Family History   Problem Relation Age of Onset   • Cancer Sister         breast   • Hypertension Father    • Diabetes Father    • Hypertension Brother      CURRENT MEDICATIONS  Home Medications    **Home medications have not yet been reviewed for this encounter**       ALLERGIES  No Known Allergies    PHYSICAL EXAM  VITAL SIGNS: /82   Pulse 86   Temp (!) 35.8 °C (96.4 °F) (Temporal)   Resp 16   Ht 1.524 m (5')   Wt 69.9 kg (154 lb 1.6 oz)   LMP 12/04/2008   SpO2 98%   BMI 30.10 kg/m²   Vitals reviewed.    Consitutional: Well-developed, well-nourished. Negative for: distress.  HENT: Normocephalic, right external ear normal, left external ear normal, oropharynx clear and moist.  Eyes: Conjunctivae normal, extraocular movements normal. Negative for: discharge in right and left eye, icterus.  Neck: Range of motion normal, supple. Negative for cervical adenopathy.  Cardiovascular: Normal rate, regular rhythm, heart sounds normal, intact distal pulses. Negative for: murmur, rub, gallop.  Pulmonary/Chest Wall: Effort normal, breath sounds normal. Negative for: respiratory distress, wheezes, rales, rhonchi.   Abdominal: Soft, bowel sounds normal. Negative for: distention, rebound, guarding. Diffuse tenderness across lower abdomen  Musculoskeletal: Normal range of motion. Negative for edema.  Left CVA tenderness.  Neurological: Alert and oriented x3. No focal deficits.  Skin: Warm, dry. Negative for rash.  Psych: Mood/affect normal, behavior normal, judgment normal.    DIAGNOSTIC STUDIES / PROCEDURES    LABS  Results for orders placed or performed during the hospital encounter of 01/21/19   CBC WITH DIFFERENTIAL    Result Value Ref Range    WBC 5.2 4.8 - 10.8 K/uL    RBC 4.46 4.20 - 5.40 M/uL    Hemoglobin 14.8 12.0 - 16.0 g/dL    Hematocrit 43.0 37.0 - 47.0 %    MCV 96.4 81.4 - 97.8 fL    MCH 33.2 (H) 27.0 - 33.0 pg    MCHC 34.4 33.6 - 35.0 g/dL    RDW 45.4 35.9 - 50.0 fL    Platelet Count 215 164 - 446 K/uL    MPV 10.3 9.0 - 12.9 fL    Neutrophils-Polys 53.60 44.00 - 72.00 %    Lymphocytes 27.90 22.00 - 41.00 %    Monocytes 16.10 (H) 0.00 - 13.40 %    Eosinophils 1.00 0.00 - 6.90 %    Basophils 1.20 0.00 - 1.80 %    Immature Granulocytes 0.20 0.00 - 0.90 %    Nucleated RBC 0.00 /100 WBC    Neutrophils (Absolute) 2.77 2.00 - 7.15 K/uL    Lymphs (Absolute) 1.44 1.00 - 4.80 K/uL    Monos (Absolute) 0.83 0.00 - 0.85 K/uL    Eos (Absolute) 0.05 0.00 - 0.51 K/uL    Baso (Absolute) 0.06 0.00 - 0.12 K/uL    Immature Granulocytes (abs) 0.01 0.00 - 0.11 K/uL    NRBC (Absolute) 0.00 K/uL   COMP METABOLIC PANEL   Result Value Ref Range    Sodium 136 135 - 145 mmol/L    Potassium 3.4 (L) 3.6 - 5.5 mmol/L    Chloride 103 96 - 112 mmol/L    Co2 26 20 - 33 mmol/L    Anion Gap 7.0 0.0 - 11.9    Glucose 100 (H) 65 - 99 mg/dL    Bun 10 8 - 22 mg/dL    Creatinine 0.64 0.50 - 1.40 mg/dL    Calcium 9.7 8.5 - 10.5 mg/dL    AST(SGOT) 19 12 - 45 U/L    ALT(SGPT) 17 2 - 50 U/L    Alkaline Phosphatase 115 (H) 30 - 99 U/L    Total Bilirubin 0.7 0.1 - 1.5 mg/dL    Albumin 4.4 3.2 - 4.9 g/dL    Total Protein 8.1 6.0 - 8.2 g/dL    Globulin 3.7 (H) 1.9 - 3.5 g/dL    A-G Ratio 1.2 g/dL   LIPASE   Result Value Ref Range    Lipase 13 11 - 82 U/L   URINALYSIS,CULTURE IF INDICATED   Result Value Ref Range    Color Yellow     Character Clear     Specific Gravity 1.012 <1.035    Ph 5.0 5.0 - 8.0    Glucose Negative Negative mg/dL    Ketones Trace (A) Negative mg/dL    Protein Negative Negative mg/dL    Bilirubin Negative Negative    Urobilinogen, Urine 0.2 Negative    Nitrite Negative Negative    Leukocyte Esterase Negative Negative    Occult Blood Negative  Negative    Micro Urine Req see below    ESTIMATED GFR   Result Value Ref Range    GFR If African American >60 >60 mL/min/1.73 m 2    GFR If Non African American >60 >60 mL/min/1.73 m 2      All labs reviewed by me.    COURSE & MEDICAL DECISION MAKING  Nursing notes, MADISON CONCEPCIONx reviewed in chart.    2:07 PM Patient seen and examined at bedside. The patient presents with left flank and lower abdominal pain and the differential diagnosis includes but is not limited to kidney, gastritis, musculoskeletal. Ordered Estimated GFR, CBC, CMP, Lipase, Urinalysis, culture if indicated.  She agrees with this plan of care.    2:15 PM Patient was reevaluated at bedside. Discussed lab results with the patient and informed them that we did not know the etiology of her pain, but she has no increased white count to indicate if infection and her urine is normal.      The patient will return for new or worsening symptoms and is stable at the time of discharge.  If she is not better over the next couple of days of taking MiraLAX, she needs to return to the emergency department for possible radiographs.    The patient is referred to a primary physician for blood pressure management, diabetic screening, and for all other preventative health concerns.    DISPOSITION:  Patient will be discharged home in stable condition.    FOLLOW UP:  BHARGAV Atwood  [address] [facility] [address] [place] [id number] [phone]    Schedule an appointment as soon as possible for a visit       [facility], Emergency Dept  [address] [facility] [address] [phone]    As needed, If symptoms worsen      OUTPATIENT MEDICATIONS:  New Prescriptions    No medications on file        FINAL IMPRESSION  1. Acute left flank pain          Jamshid JACOB (Karol), am scribing for, and in the presence of, Jacki Arreola M.D..    Electronically signed by: Jamshid Reyez (Karol), 1/21/2019    Jacki JACOB M.D. personally  performed the services described in this documentation, as scribed by Jamshid Reyez in my presence, and it is both accurate and complete.  E.    The note accurately reflects work and decisions made by me.  Jacki Arreola  1/21/2019  7:34 PM

## 2019-01-24 ENCOUNTER — OFFICE VISIT (OUTPATIENT)
Dept: MEDICAL GROUP | Facility: MEDICAL CENTER | Age: 59
End: 2019-01-24
Payer: COMMERCIAL

## 2019-01-24 VITALS
WEIGHT: 150 LBS | RESPIRATION RATE: 16 BRPM | DIASTOLIC BLOOD PRESSURE: 74 MMHG | BODY MASS INDEX: 29.45 KG/M2 | OXYGEN SATURATION: 96 % | SYSTOLIC BLOOD PRESSURE: 122 MMHG | HEART RATE: 89 BPM | HEIGHT: 60 IN

## 2019-01-24 DIAGNOSIS — Z86.19 HISTORY OF HEPATITIS C: ICD-10-CM

## 2019-01-24 DIAGNOSIS — E03.4 HYPOTHYROIDISM DUE TO ACQUIRED ATROPHY OF THYROID: ICD-10-CM

## 2019-01-24 DIAGNOSIS — Z00.00 ROUTINE GENERAL MEDICAL EXAMINATION AT A HEALTH CARE FACILITY: ICD-10-CM

## 2019-01-24 DIAGNOSIS — Z23 NEED FOR TDAP VACCINATION: ICD-10-CM

## 2019-01-24 DIAGNOSIS — R10.32 LEFT LOWER QUADRANT PAIN: ICD-10-CM

## 2019-01-24 PROCEDURE — 90715 TDAP VACCINE 7 YRS/> IM: CPT | Performed by: NURSE PRACTITIONER

## 2019-01-24 PROCEDURE — 99213 OFFICE O/P EST LOW 20 MIN: CPT | Mod: 25 | Performed by: NURSE PRACTITIONER

## 2019-01-24 PROCEDURE — 90471 IMMUNIZATION ADMIN: CPT | Performed by: NURSE PRACTITIONER

## 2019-01-24 ASSESSMENT — PATIENT HEALTH QUESTIONNAIRE - PHQ9: CLINICAL INTERPRETATION OF PHQ2 SCORE: 0

## 2019-01-24 ASSESSMENT — ENCOUNTER SYMPTOMS: ABDOMINAL PAIN: 1

## 2019-01-24 NOTE — PROGRESS NOTES
Subjective:      Lyn Morris is a 58 y.o. female who presents with Hospital Follow-up (ABDOMINAL PAIN )        CC: Patient is here today for emergency room follow-up for abdominal pain as well as for upcoming lab work for her thyroid disease and hepatitis C history.    HPI Lyn Morris      1. Left lower quadrant pain  Patient went to the emergency room 3 days ago with complaints of left lower quadrant pain for 2 days.  She otherwise was feeling well.  She had laboratory workup including urinalysis which was negative and her white blood cell count was not elevated and she had no anemia.  Lipase was normal and liver fibrosis testing was negative.  She was discharged home with medication for nausea and states she is feeling well.  She has just mild discomfort now in the lower abdomen which comes and goes and she has had no problems with nausea, vomiting, constipation, diarrhea or vaginal complaints.    2. History of hepatitis C  Patient has history of hepatitis C which she states was treated in the past.    3. Hypothyroidism due to acquired atrophy of thyroid  Last TSH was therapeutic on current dose of levothyroxine 88 mcg.    4. Need for Tdap vaccination  Patient states she is up-to-date or declines all immunizations except for Tdap.    5. Routine general medical examination at a health care facility  Patient will be due for her routine lab work in July.  Current Outpatient Prescriptions   Medication Sig Dispense Refill   • levothyroxine (SYNTHROID) 88 MCG Tab TAKE ONE TABLET BY MOUTH ONCE DAILY 90 Tab 3   • fluticasone (FLONASE) 50 MCG/ACT nasal spray Spray 2 Sprays in nose every day. 16 g 11   • albuterol (VENTOLIN OR PROVENTIL) 108 (90 BASE) MCG/ACT AERS inhalation aerosol Inhale 2 Puffs by mouth every 6 hours as needed for Shortness of Breath. 8.5 g 3     No current facility-administered medications for this visit.      Social History   Substance Use Topics   • Smoking status: Never Smoker   • Smokeless  tobacco: Never Used   • Alcohol use No     Past Medical History:   Diagnosis Date   • Family history of diabetes mellitus    • Hepatitis C 2003    fibrosis, treated fall 2003, Dr. 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   All other systems reviewed and are negative.         Objective:     /74 (BP Location: Right arm, Patient Position: Sitting, BP Cuff Size: Adult)   Pulse 89   Resp 16   Ht 1.524 m (5')   Wt 68 kg (150 lb)   LMP 12/04/2008   SpO2 96%   BMI 29.29 kg/m²      Physical Exam   Constitutional: She is oriented to person, place, and time. She appears well-developed and well-nourished. No distress.   HENT:   Head: Normocephalic and atraumatic.   Right Ear: External ear normal.   Left Ear: External ear normal.   Nose: Nose normal.   Eyes: Right eye exhibits no discharge. Left eye exhibits no discharge.   Neck: Normal range of motion. Neck supple. No thyromegaly present.   Cardiovascular: Normal rate, regular rhythm and normal heart sounds.  Exam reveals no gallop and no friction rub.    No murmur heard.  Pulmonary/Chest: Effort normal and breath sounds normal. She has no wheezes. She has no rales.   Abdominal: Soft. Normal appearance. There is no hepatosplenomegaly. There is tenderness in the left lower quadrant. There is no rigidity, no rebound, no guarding and no CVA tenderness. No hernia.   Patient reports just mild tenderness to deep palpation in the left lower quadrant.   Musculoskeletal: She exhibits no edema or tenderness.   Neurological: She is alert and oriented to person, place, and time. She displays normal reflexes.   Skin: Skin is warm and dry. No rash noted. She is not diaphoretic.   Psychiatric:  She has a normal mood and affect. Her behavior is normal. Judgment and thought content normal.   Nursing note and vitals reviewed.              Assessment/Plan:     1. Left lower quadrant pain  Patient reports feeling better after emergency room visit with normal workup.  I advised her that should it return we might want to do a CT of the abdomen for follow-up.  I reviewed her lab work with her which was normal.    2. History of hepatitis C  Patient reports that this had been treated in the past and her liver enzymes and liver fibrosis blood work is normal.    3. Hypothyroidism due to acquired atrophy of thyroid  Patient to do TSH in July with most recent TSH therapeutic.  - TSH; Future    4. Need for Tdap vaccination  I have placed the below orders and discussed them with an approved delegating provider. The MA is performing the below orders under the direction of Dr. Vega    - Tdap =>6yo IM    5. Routine general medical examination at a health care facility  Patient will do lab work in July.  - COMP METABOLIC PANEL; Future  - Lipid Profile; Future

## 2019-02-11 DIAGNOSIS — N64.4 BREAST PAIN, LEFT: ICD-10-CM

## 2019-03-08 ENCOUNTER — HOSPITAL ENCOUNTER (OUTPATIENT)
Dept: RADIOLOGY | Facility: MEDICAL CENTER | Age: 59
End: 2019-03-08
Attending: NURSE PRACTITIONER
Payer: COMMERCIAL

## 2019-03-08 DIAGNOSIS — N64.4 BREAST PAIN, LEFT: ICD-10-CM

## 2019-03-08 PROCEDURE — G0279 TOMOSYNTHESIS, MAMMO: HCPCS

## 2019-03-08 PROCEDURE — 76642 ULTRASOUND BREAST LIMITED: CPT | Mod: LT

## 2019-07-12 ENCOUNTER — HOSPITAL ENCOUNTER (OUTPATIENT)
Dept: LAB | Facility: MEDICAL CENTER | Age: 59
End: 2019-07-12
Attending: NURSE PRACTITIONER
Payer: COMMERCIAL

## 2019-07-12 DIAGNOSIS — Z00.00 ROUTINE GENERAL MEDICAL EXAMINATION AT A HEALTH CARE FACILITY: ICD-10-CM

## 2019-07-12 DIAGNOSIS — E03.4 HYPOTHYROIDISM DUE TO ACQUIRED ATROPHY OF THYROID: ICD-10-CM

## 2019-07-12 LAB
ALBUMIN SERPL BCP-MCNC: 4 G/DL (ref 3.2–4.9)
ALBUMIN/GLOB SERPL: 1.3 G/DL
ALP SERPL-CCNC: 96 U/L (ref 30–99)
ALT SERPL-CCNC: 16 U/L (ref 2–50)
ANION GAP SERPL CALC-SCNC: 8 MMOL/L (ref 0–11.9)
AST SERPL-CCNC: 26 U/L (ref 12–45)
BILIRUB SERPL-MCNC: 0.5 MG/DL (ref 0.1–1.5)
BUN SERPL-MCNC: 14 MG/DL (ref 8–22)
CALCIUM SERPL-MCNC: 8.6 MG/DL (ref 8.5–10.5)
CHLORIDE SERPL-SCNC: 109 MMOL/L (ref 96–112)
CHOLEST SERPL-MCNC: 151 MG/DL (ref 100–199)
CO2 SERPL-SCNC: 25 MMOL/L (ref 20–33)
CREAT SERPL-MCNC: 0.61 MG/DL (ref 0.5–1.4)
GLOBULIN SER CALC-MCNC: 3.1 G/DL (ref 1.9–3.5)
GLUCOSE SERPL-MCNC: 105 MG/DL (ref 65–99)
HDLC SERPL-MCNC: 60 MG/DL
LDLC SERPL CALC-MCNC: 76 MG/DL
POTASSIUM SERPL-SCNC: 4 MMOL/L (ref 3.6–5.5)
PROT SERPL-MCNC: 7.1 G/DL (ref 6–8.2)
SODIUM SERPL-SCNC: 142 MMOL/L (ref 135–145)
TRIGL SERPL-MCNC: 73 MG/DL (ref 0–149)
TSH SERPL DL<=0.005 MIU/L-ACNC: 0.84 UIU/ML (ref 0.38–5.33)

## 2019-07-12 PROCEDURE — 36415 COLL VENOUS BLD VENIPUNCTURE: CPT

## 2019-07-12 PROCEDURE — 84443 ASSAY THYROID STIM HORMONE: CPT

## 2019-07-12 PROCEDURE — 80061 LIPID PANEL: CPT

## 2019-07-12 PROCEDURE — 80053 COMPREHEN METABOLIC PANEL: CPT

## 2019-10-24 ENCOUNTER — OFFICE VISIT (OUTPATIENT)
Dept: MEDICAL GROUP | Facility: MEDICAL CENTER | Age: 59
End: 2019-10-24
Payer: COMMERCIAL

## 2019-10-24 VITALS
WEIGHT: 152 LBS | RESPIRATION RATE: 16 BRPM | BODY MASS INDEX: 29.84 KG/M2 | OXYGEN SATURATION: 98 % | SYSTOLIC BLOOD PRESSURE: 146 MMHG | DIASTOLIC BLOOD PRESSURE: 80 MMHG | HEIGHT: 60 IN | HEART RATE: 65 BPM

## 2019-10-24 DIAGNOSIS — I10 ESSENTIAL HYPERTENSION: ICD-10-CM

## 2019-10-24 DIAGNOSIS — E03.4 HYPOTHYROIDISM DUE TO ACQUIRED ATROPHY OF THYROID: ICD-10-CM

## 2019-10-24 DIAGNOSIS — R73.9 BLOOD GLUCOSE ELEVATED: ICD-10-CM

## 2019-10-24 LAB
HBA1C MFR BLD: 5.4 % (ref 0–5.6)
INT CON NEG: NEGATIVE
INT CON POS: POSITIVE

## 2019-10-24 PROCEDURE — 83036 HEMOGLOBIN GLYCOSYLATED A1C: CPT | Performed by: NURSE PRACTITIONER

## 2019-10-24 PROCEDURE — 99214 OFFICE O/P EST MOD 30 MIN: CPT | Performed by: NURSE PRACTITIONER

## 2019-10-24 RX ORDER — LOSARTAN POTASSIUM 50 MG/1
50 TABLET ORAL DAILY
Qty: 90 TAB | Refills: 3 | Status: SHIPPED | OUTPATIENT
Start: 2019-10-24 | End: 2021-06-17

## 2019-10-24 NOTE — PROGRESS NOTES
Subjective:      Lyn Morris is a 59 y.o. female who presents with Hypertension        CC: Patient is here today to review mild elevations in blood glucose as well as return of hypertension.    HPI       1. Essential hypertension  Patient states she has a history of hypertension for which she was on low-dose lisinopril in the past but she did not like to take pills and stopped them.  Her blood pressure has been running well but over the past few months it has been running in the 140s to 150s over 90s range when she checks it outside the office.  The first reading today done by the medical assistant is elevated and then I rechecked the blood pressure and it was 146/86.    2. Blood glucose elevated  Patient's last 2 fasting blood sugar readings were mildly elevated and she is at risk for diabetes because of her age and weight.  She admits she does have difficulty with weight loss    3. Hypothyroidism due to acquired atrophy of thyroid  Most recent TSH was therapeutic in July on her current dose of levothyroxine  Past Medical History:   Diagnosis Date   • Family history of diabetes mellitus    • Hepatitis C 2003    fibrosis, treated fall 2003, Dr. 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     Social History     Socioeconomic History   • Marital status:      Spouse name: Not on file   • Number of children: Not on file   • Years of education: Not on file   • Highest education level: Not on file   Occupational History   • Occupation: [employer], CNA     Employer: unknown   Social Needs   • Financial resource strain: Not on file   • Food insecurity:     Worry: Not on file     Inability: Not on file   • Transportation needs:     Medical: Not on file     Non-medical: Not on file   Tobacco Use   • Smoking status: Never Smoker   • Smokeless tobacco: Never Used   Substance and Sexual Activity   • Alcohol use: No     Alcohol/week: 0.0 oz   • Drug use: No   • Sexual  activity: Yes     Partners: Male   Lifestyle   • Physical activity:     Days per week: Not on file     Minutes per session: Not on file   • Stress: Not on file   Relationships   • Social connections:     Talks on phone: Not on file     Gets together: Not on file     Attends Protestant service: Not on file     Active member of club or organization: Not on file     Attends meetings of clubs or organizations: Not on file     Relationship status: Not on file   • Intimate partner violence:     Fear of current or ex partner: Not on file     Emotionally abused: Not on file     Physically abused: Not on file     Forced sexual activity: Not on file   Other Topics Concern   • Not on file   Social History Narrative   • Not on file     Current Outpatient Medications   Medication Sig Dispense Refill   • losartan (COZAAR) 50 MG Tab Take 1 Tab by mouth every day. 90 Tab 3   • levothyroxine (SYNTHROID) 88 MCG Tab TAKE ONE TABLET BY MOUTH ONCE DAILY 90 Tab 3   • fluticasone (FLONASE) 50 MCG/ACT nasal spray Spray 2 Sprays in nose every day. 16 g 11   • albuterol (VENTOLIN OR PROVENTIL) 108 (90 BASE) MCG/ACT AERS inhalation aerosol Inhale 2 Puffs by mouth every 6 hours as needed for Shortness of Breath. 8.5 g 3     No current facility-administered medications for this visit.      Family History   Problem Relation Age of Onset   • Cancer Sister         breast   • Hypertension Father    • Diabetes Father    • Hypertension Brother          Review of Systems   All other systems reviewed and are negative.         Objective:     /80 (BP Location: Left arm, Patient Position: Sitting, BP Cuff Size: Adult)   Pulse 65   Resp 16   Ht 1.524 m (5')   Wt 68.9 kg (152 lb)   LMP 12/04/2008   SpO2 98%   BMI 29.69 kg/m²      Physical Exam   Constitutional: She is oriented to person, place, and time. She appears well-developed and well-nourished. No distress.   HENT:   Head: Normocephalic and atraumatic.   Right Ear: External ear normal.    Left Ear: External ear normal.   Nose: Nose normal.   Eyes: Right eye exhibits no discharge. Left eye exhibits no discharge.   Neck: Normal range of motion. Neck supple. No thyromegaly present.   Cardiovascular: Normal rate, regular rhythm and normal heart sounds. Exam reveals no gallop and no friction rub.   No murmur heard.  Pulmonary/Chest: Effort normal and breath sounds normal. She has no wheezes. She has no rales.   Musculoskeletal: She exhibits no edema or tenderness.   Neurological: She is alert and oriented to person, place, and time. She displays normal reflexes.   Skin: Skin is warm and dry. No rash noted. She is not diaphoretic.   Psychiatric: She has a normal mood and affect. Her behavior is normal. Judgment and thought content normal.   Nursing note and vitals reviewed.              Assessment/Plan:     1. Essential hypertension  Patient's blood pressure was checked twice in the office and was elevated.  Her readings outside the office also were elevated and she was treated for hypertension at one time.  I will start her on losartan and she will stay on the medicine with a goal of a blood pressure of 130/80 or less without side effects.  Blood pressure teaching included:    A. Decrease in sodium intake; Explained the various foods which are high in sodium and to avoid them. Discussed other options to salt. B. Stress; stressed need to try to limit stress. C. Exercise; advised increasing exercise gradually. D. Stimulants; Instructed that certain drugs like caffeine, sudafed, and meth. can raise B/P and to avoid them. E. Weight loss; Follow a low-fat, low cholesterol diet to lose weight and help with lipids. F. Smoking; Stop or don't start. G. Medications; Take anti-hypertensives daily at the same time each day.  - losartan (COZAAR) 50 MG Tab; Take 1 Tab by mouth every day.  Dispense: 90 Tab; Refill: 3    2. Blood glucose elevated  Although patient's last 2 blood sugars were mildly elevated, hemoglobin  A1c in the office is normal at 5.4  - POCT  A1C    3. Hypothyroidism due to acquired atrophy of thyroid  TSH is therapeutic on current dose of levothyroxine.

## 2019-11-15 ENCOUNTER — OFFICE VISIT (OUTPATIENT)
Dept: MEDICAL GROUP | Facility: MEDICAL CENTER | Age: 59
End: 2019-11-15
Payer: COMMERCIAL

## 2019-11-15 VITALS
BODY MASS INDEX: 30.23 KG/M2 | HEART RATE: 69 BPM | TEMPERATURE: 97.6 F | SYSTOLIC BLOOD PRESSURE: 130 MMHG | WEIGHT: 154 LBS | HEIGHT: 60 IN | OXYGEN SATURATION: 97 % | RESPIRATION RATE: 16 BRPM | DIASTOLIC BLOOD PRESSURE: 70 MMHG

## 2019-11-15 DIAGNOSIS — I10 ESSENTIAL HYPERTENSION: ICD-10-CM

## 2019-11-15 DIAGNOSIS — R05.3 PERSISTENT COUGH: ICD-10-CM

## 2019-11-15 PROCEDURE — 99214 OFFICE O/P EST MOD 30 MIN: CPT | Performed by: NURSE PRACTITIONER

## 2019-11-15 RX ORDER — FLUTICASONE PROPIONATE 50 MCG
2 SPRAY, SUSPENSION (ML) NASAL DAILY
Qty: 16 G | Refills: 11 | Status: SHIPPED
Start: 2019-11-15 | End: 2020-02-28

## 2019-11-15 RX ORDER — AZITHROMYCIN 250 MG/1
TABLET, FILM COATED ORAL
Qty: 1 QUANTITY SUFFICIENT | Refills: 0 | Status: SHIPPED
Start: 2019-11-15 | End: 2020-02-28

## 2019-11-15 ASSESSMENT — ENCOUNTER SYMPTOMS
COUGH: 1
SPUTUM PRODUCTION: 1

## 2019-11-15 NOTE — PROGRESS NOTES
Subjective:      Lyn Morris is a 59 y.o. female who presents with Cough        CC: Patient here today for new problem with cough as well as follow-up on hypertension.    HPI 1. Persistent cough  Patient states that just after she was seen last month, approximately 2 weeks ago, she has had a cough.  She states it seems to be worse in the evening and she is bringing up a thick white phlegm.  She does have history of asthma and allergies and does have albuterol to use but is not using any other medicines.  She was recently started on losartan for hypertension.  She denies chills, fever, nausea, shortness of breath or chest pain.    2. Essential hypertension  Patient states she is taking her losartan and her blood pressure shows improvement in the office today.      .  Past Medical History:   Diagnosis Date   • Family history of diabetes mellitus    • Hepatitis C 2003    fibrosis, treated fall 2003, Dr. 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     Social History     Socioeconomic History   • Marital status:      Spouse name: Not on file   • Number of children: Not on file   • Years of education: Not on file   • Highest education level: Not on file   Occupational History   • Occupation: [employer], CNA     Employer: unknown   Social Needs   • Financial resource strain: Not on file   • Food insecurity:     Worry: Not on file     Inability: Not on file   • Transportation needs:     Medical: Not on file     Non-medical: Not on file   Tobacco Use   • Smoking status: Never Smoker   • Smokeless tobacco: Never Used   Substance and Sexual Activity   • Alcohol use: No     Alcohol/week: 0.0 oz   • Drug use: No   • Sexual activity: Yes     Partners: Male   Lifestyle   • Physical activity:     Days per week: Not on file     Minutes per session: Not on file   • Stress: Not on file   Relationships   • Social connections:     Talks on phone: Not on file     Gets  together: Not on file     Attends Sikh service: Not on file     Active member of club or organization: Not on file     Attends meetings of clubs or organizations: Not on file     Relationship status: Not on file   • Intimate partner violence:     Fear of current or ex partner: Not on file     Emotionally abused: Not on file     Physically abused: Not on file     Forced sexual activity: Not on file   Other Topics Concern   • Not on file   Social History Narrative   • Not on file     Current Outpatient Medications   Medication Sig Dispense Refill   • azithromycin (ZITHROMAX Z-EARLENE) 250 MG Tab One Pack 1 Quantity Sufficient 0   • fluticasone (FLONASE) 50 MCG/ACT nasal spray Spray 2 Sprays in nose every day. 16 g 11   • losartan (COZAAR) 50 MG Tab Take 1 Tab by mouth every day. 90 Tab 3   • levothyroxine (SYNTHROID) 88 MCG Tab TAKE ONE TABLET BY MOUTH ONCE DAILY 90 Tab 3   • albuterol (VENTOLIN OR PROVENTIL) 108 (90 BASE) MCG/ACT AERS inhalation aerosol Inhale 2 Puffs by mouth every 6 hours as needed for Shortness of Breath. 8.5 g 3     No current facility-administered medications for this visit.      Family History   Problem Relation Age of Onset   • Cancer Sister         breast   • Hypertension Father    • Diabetes Father    • Hypertension Brother          Review of Systems   Respiratory: Positive for cough and sputum production.    All other systems reviewed and are negative.         Objective:     /70 (BP Location: Right arm, Patient Position: Sitting, BP Cuff Size: Adult)   Pulse 69   Temp 36.4 °C (97.6 °F) (Temporal)   Resp 16   Ht 1.524 m (5')   Wt 69.9 kg (154 lb)   LMP 12/04/2008   SpO2 97%   BMI 30.08 kg/m²      Physical Exam  Vitals signs and nursing note reviewed.   Constitutional:       General: She is not in acute distress.     Appearance: She is well-developed. She is not diaphoretic.   HENT:      Head: Normocephalic and atraumatic.      Right Ear: External ear normal.      Left Ear:  External ear normal.      Nose: Nose normal.   Eyes:      General:         Right eye: No discharge.         Left eye: No discharge.   Neck:      Musculoskeletal: Normal range of motion and neck supple.      Thyroid: No thyromegaly.   Cardiovascular:      Rate and Rhythm: Normal rate and regular rhythm.      Heart sounds: Normal heart sounds. No murmur. No friction rub. No gallop.    Pulmonary:      Effort: Pulmonary effort is normal.      Breath sounds: Normal breath sounds. No wheezing or rales.   Musculoskeletal:         General: No tenderness.   Skin:     General: Skin is warm and dry.      Findings: No rash.   Neurological:      Mental Status: She is alert and oriented to person, place, and time.      Deep Tendon Reflexes: Reflexes normal.   Psychiatric:         Behavior: Behavior normal.         Thought Content: Thought content normal.         Judgment: Judgment normal.                 Assessment/Plan:     1. Persistent cough  Patient advised this could be allergies, viral, bacterial or medication side effect.  Symptoms did start after her losartan but only 3% of people develop cough on the medicine.  I did advise her to go on Flonase nasal spray and an antihistamine to see if allergies play a part and if it does not improve then she will start a Z-Earlene.  I talked her about a chest x-ray and pulmonary function studies if it does not improve.  She has albuterol to use as needed.  - azithromycin (ZITHROMAX Z-EARLENE) 250 MG Tab; One Pack  Dispense: 1 Quantity Sufficient; Refill: 0  - fluticasone (FLONASE) 50 MCG/ACT nasal spray; Spray 2 Sprays in nose every day.  Dispense: 16 g; Refill: 11    2. Essential hypertension  Blood pressure appears well controlled and if her cough does not improve I may have to take her off losartan just to be sure she is not 1 of the 3% who developed cough on this.

## 2020-02-19 ENCOUNTER — OFFICE VISIT (OUTPATIENT)
Dept: URGENT CARE | Facility: PHYSICIAN GROUP | Age: 60
End: 2020-02-19
Payer: COMMERCIAL

## 2020-02-19 VITALS
RESPIRATION RATE: 12 BRPM | HEIGHT: 60 IN | BODY MASS INDEX: 30.43 KG/M2 | WEIGHT: 155 LBS | HEART RATE: 64 BPM | DIASTOLIC BLOOD PRESSURE: 88 MMHG | TEMPERATURE: 98 F | OXYGEN SATURATION: 97 % | SYSTOLIC BLOOD PRESSURE: 108 MMHG

## 2020-02-19 DIAGNOSIS — H81.12 BENIGN PAROXYSMAL POSITIONAL VERTIGO OF LEFT EAR: ICD-10-CM

## 2020-02-19 DIAGNOSIS — R11.2 INTRACTABLE VOMITING WITH NAUSEA, UNSPECIFIED VOMITING TYPE: ICD-10-CM

## 2020-02-19 PROCEDURE — 99214 OFFICE O/P EST MOD 30 MIN: CPT | Performed by: NURSE PRACTITIONER

## 2020-02-19 RX ORDER — MECLIZINE HYDROCHLORIDE 25 MG/1
25 TABLET ORAL 3 TIMES DAILY PRN
Qty: 30 TAB | Refills: 0 | Status: SHIPPED | OUTPATIENT
Start: 2020-02-19 | End: 2021-06-17

## 2020-02-19 RX ORDER — ONDANSETRON 4 MG/1
4 TABLET, FILM COATED ORAL EVERY 4 HOURS PRN
Qty: 20 TAB | Refills: 0 | Status: SHIPPED | OUTPATIENT
Start: 2020-02-19 | End: 2020-02-29

## 2020-02-19 ASSESSMENT — ENCOUNTER SYMPTOMS: DIZZINESS: 1

## 2020-02-20 ASSESSMENT — ENCOUNTER SYMPTOMS
BLURRED VISION: 0
MYALGIAS: 0
CHILLS: 0
ABDOMINAL PAIN: 0
WEAKNESS: 0
VERTIGO: 1
SORE THROAT: 0
SHORTNESS OF BREATH: 0
CONSTIPATION: 0
FEVER: 0
NAUSEA: 1
DOUBLE VISION: 0
HEADACHES: 0
NECK PAIN: 0
VOMITING: 1
PALPITATIONS: 0
SINUS PAIN: 0
COUGH: 0
WEIGHT LOSS: 0

## 2020-02-20 NOTE — PROGRESS NOTES
Subjective:     Lyn Morris is a 59 y.o. female who presents for Dizziness (Been getting dizzy only when laying down x 2 days)      Dizziness   Associated symptoms include nausea, vertigo and vomiting. Pertinent negatives include no abdominal pain, chest pain, chills, congestion, coughing, fever, headaches, myalgias, neck pain, rash, sore throat or weakness.     Pt presents for evaluation of a reoccurring problem. For the past two days she has had episodes of vertigo. She states her dizziness is worse when lying down and worse on the left side of her ear. She states during this dizziness she becomes nauseous and has vomited multiple times. She has not used anything for the relief of her symptoms. She was seen in the ER for vertigo 7/17/2018. Her vertigo at this time resolved spontaneously.     Review of Systems   Constitutional: Negative for chills, fever and weight loss.   HENT: Negative for congestion, ear discharge, ear pain, hearing loss, sinus pain, sore throat and tinnitus.    Eyes: Negative for blurred vision and double vision.   Respiratory: Negative for cough and shortness of breath.    Cardiovascular: Negative for chest pain and palpitations.   Gastrointestinal: Positive for nausea and vomiting. Negative for abdominal pain and constipation.   Genitourinary: Negative for dysuria, frequency and urgency.   Musculoskeletal: Negative for myalgias and neck pain.   Skin: Negative for rash.   Neurological: Positive for dizziness and vertigo. Negative for weakness and headaches.       PMH:   Past Medical History:   Diagnosis Date   • Family history of diabetes mellitus    • Hepatitis C 2003    fibrosis, treated fall 2003, Dr. 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     ALLERGIES: No Known Allergies  SURGHX:   Past Surgical History:   Procedure Laterality Date   • VAGINAL HYSTERECTOMY TOTAL  12/11/2008    Performed by KATY CONN at [facility]   • ANTERIOR AND POSTERIOR REPAIR  12/11/2008    Performed by KATY CONN at [facility]   • COLONOSCOPY  2003    normal     SOCHX:   Social History     Socioeconomic History   • Marital status:      Spouse name: Not on file   • Number of children: Not on file   • Years of education: Not on file   • Highest education level: Not on file   Occupational History   • Occupation: [employer], CNA     Employer: unknown   Social Needs   • Financial resource strain: Not on file   • Food insecurity     Worry: Not on file     Inability: Not on file   • Transportation needs     Medical: Not on file     Non-medical: Not on file   Tobacco Use   • Smoking status: Never Smoker   • Smokeless tobacco: Never Used   Substance and Sexual Activity   • Alcohol use: No     Alcohol/week: 0.0 oz   • Drug use: No   • Sexual activity: Yes     Partners: Male   Lifestyle   • Physical activity     Days per week: Not on file     Minutes per session: Not on file   • Stress: Not on file   Relationships   • Social connections     Talks on phone: Not on file     Gets together: Not on file     Attends Advent service: Not on file     Active member of club or organization: Not on file     Attends meetings of clubs or organizations: Not on file     Relationship status: Not on file   • Intimate partner violence     Fear of current or ex partner: Not on file     Emotionally abused: Not on file     Physically abused: Not on file     Forced sexual activity: Not on file   Other Topics Concern   • Not on file   Social History Narrative   • Not on file     FH:   Family History   Problem Relation Age of Onset   • Cancer Sister         breast   • Hypertension Father    • Diabetes Father    • Hypertension Brother          Objective:   /88   Pulse 64   Temp 36.7 °C (98 °F)   Resp 12   Ht 1.524 m (5')   Wt 70.3 kg (155 lb)   LMP 12/04/2008   SpO2 97%   BMI 30.27 kg/m²     Physical Exam  Vitals signs and nursing  note reviewed.   Constitutional:       Appearance: Normal appearance.   HENT:      Head: Normocephalic and atraumatic.      Right Ear: External ear normal.      Nose: No congestion or rhinorrhea.      Mouth/Throat:      Mouth: Mucous membranes are moist.   Eyes:      Extraocular Movements: Extraocular movements intact.      Pupils: Pupils are equal, round, and reactive to light.   Neck:      Musculoskeletal: Normal range of motion and neck supple.   Cardiovascular:      Rate and Rhythm: Normal rate and regular rhythm.      Heart sounds: Normal heart sounds.   Pulmonary:      Effort: Pulmonary effort is normal.      Breath sounds: Normal breath sounds.   Abdominal:      General: Abdomen is flat. There is no distension.   Musculoskeletal:         General: No swelling or tenderness.   Skin:     General: Skin is warm and dry.      Findings: No rash.   Neurological:      General: No focal deficit present.      Mental Status: She is alert and oriented to person, place, and time. Mental status is at baseline.      Cranial Nerves: No cranial nerve deficit.      Motor: No weakness.      Coordination: Coordination normal.      Gait: Gait normal.   Psychiatric:         Mood and Affect: Mood normal.         Behavior: Behavior normal.         Thought Content: Thought content normal.         Judgment: Judgment normal.         Assessment/Plan:   Assessment      1. Benign paroxysmal positional vertigo of left ear  - REFERRAL TO PHYSICAL THERAPY Reason for Therapy: Eval/Treat/Report  - meclizine (ANTIVERT) 25 MG Tab; Take 1 Tab by mouth 3 times a day as needed.  Dispense: 30 Tab; Refill: 0    2. Intractable vomiting with nausea, unspecified vomiting type  - ondansetron (ZOFRAN) 4 MG Tab tablet; Take 1 Tab by mouth every four hours as needed for Nausea/Vomiting for up to 10 days.  Dispense: 20 Tab; Refill: 0  We discussed differential diagnoses and causes of vertigo. She will follow up with PT for vestibular therapy for relief of her  symptoms.    AVS handout given and reviewed with patient. Pt educated on red flags and when to seek treatment back in ER or UC.

## 2020-02-20 NOTE — PATIENT INSTRUCTIONS
Meniere Disease  Meniere disease is an inner ear disorder. It causes attacks of a spinning sensation (vertigo) and ringing in the ear (tinnitus). It also causes hearing loss and a sensation of fullness or pressure in your ear.   Meniere disease is lifelong. It may get worse over time. Symptoms usually begin in one ear but may eventually affect both ears.   CAUSES  Meniere disease is caused by having too much of the fluid that is in your inner ear (endolymph). When endolymph builds up in your inner ear, it affects the nerves that control balance and hearing. The reason for the endolymph buildup is not known. Possible causes include:  · Allergy.  · An abnormal reaction of the body's defense system (autoimmune disease).  · Viral infection of the inner ear.  · Head injury.  RISK FACTORS  · Age older than 40 years.  · Family history of Meniere disease.  · History of autoimmune disease.  · History of migraine headaches.  SIGNS AND SYMPTOMS  Symptoms of Meniere disease can come and go and may last for up to 4 hours at a time. Symptoms usually start in one ear and may become more frequent and eventually involve both ears. Symptoms can include:  · Fullness and pressure in your ear.  · Roaring or ringing in your ear.  · Vertigo and loss of balance.  · Decreased hearing.  · Nausea and vomiting.  DIAGNOSIS  Your health care provider will perform a physical exam. Tests may be done to confirm a diagnosis of Meniere disease. These tests may include:  · A hearing test (audiogram).  · An electronystagmogram. This tests your balance nerve (vestibular nerve).  · Imaging studies, such as CT or MRI, of your inner ear.  TREATMENT  There is no cure for Meniere disease, but it can be managed. Management may include:  · A diet that may help relieve symptoms of Meniere disease.  · Use of medicines to reduce:  ¨ Vertigo.  ¨ Nausea.  ¨ Fluid retention.  · Use of an air pressure pulse generator. This is a machine that sends small pressure  pulses into your ear canal.  · Inner ear surgery (rare).  When you experience symptoms, it can be helpful to lie down on a flat surface and focus your eyes on one object that does not move. Try to stay in that position until your symptoms go away.   HOME CARE INSTRUCTIONS   · Take medicines only as directed by your health care provider.  · Eat the same amount of food at the same time every day, including snacks.  · Do not skip meals.  · Limit the salt in your diet to 1,000 mg a day.  · Avoid caffeine.  · Limit alcoholic drinks to one drink a day.  · Do not eat foods containing monosodium glutamate (MSG).  · Drink enough fluids to keep your urine clear or pale yellow.  · Do not use any tobacco products including cigarettes, chewing tobacco, or electronic cigarettes. If you need help quitting, ask your health care provider.  · Find ways to reduce or avoid stress.  SEEK MEDICAL CARE IF:   · You have symptoms that last longer than 4 hours.  · You have new or more severe symptoms.  SEEK IMMEDIATE MEDICAL CARE IF:   · You have been vomiting for 24 hours.  · You are not able to keep fluids down.  · You have chest pain or trouble breathing.  This information is not intended to replace advice given to you by your health care provider. Make sure you discuss any questions you have with your health care provider.  Document Released: 12/15/2001 Document Revised: 01/08/2016 Document Reviewed: 12/01/2014  Elsevier Interactive Patient Education © 2017 [employer]  Vertigo  Introduction  Vertigo means that you feel like you are moving when you are not. Vertigo can also make you feel like things around you are moving when they are not. This feeling can come and go at any time. Vertigo often goes away on its own.  Follow these instructions at home:  · Avoid making fast movements.  · Avoid driving.  · Avoid using heavy machinery.  · Avoid doing any task or activity that might cause danger to you or other people if you would have a  vertigo attack while you are doing it.  · Sit down right away if you feel dizzy or have trouble with your balance.  · Take over-the-counter and prescription medicines only as told by your doctor.  · Follow instructions from your doctor about which positions or movements you should avoid.  · Drink enough fluid to keep your pee (urine) clear or pale yellow.  · Keep all follow-up visits as told by your doctor. This is important.  Contact a doctor if:  · Medicine does not help your vertigo.  · You have a fever.  · Your problems get worse or you have new symptoms.  · Your family or friends see changes in your behavior.  · You feel sick to your stomach (nauseous) or you throw up (vomit).  · You have a “pins and needles” feeling or you are numb in part of your body.  Get help right away if:  · You have trouble moving or talking.  · You are always dizzy.  · You pass out (faint).  · You get very bad headaches.  · You feel weak or have trouble using your hands, arms, or legs.  · You have changes in your hearing.  · You have changes in your seeing (vision).  · You get a stiff neck.  · Bright light starts to bother you.  This information is not intended to replace advice given to you by your health care provider. Make sure you discuss any questions you have with your health care provider.  Document Released: 09/26/2009 Document Revised: 05/25/2017 Document Reviewed: 04/11/2016  © 2017 Elsevier  Benign Positional Vertigo  Introduction  Vertigo is the feeling that you or your surroundings are moving when they are not. Benign positional vertigo is the most common form of vertigo. The cause of this condition is not serious (is benign). This condition is triggered by certain movements and positions (is positional). This condition can be dangerous if it occurs while you are doing something that could endanger you or others, such as driving.  What are the causes?  In many cases, the cause of this condition is not known. It may be  caused by a disturbance in an area of the inner ear that helps your brain to sense movement and balance. This disturbance can be caused by a viral infection (labyrinthitis), head injury, or repetitive motion.  What increases the risk?  This condition is more likely to develop in:  · Women.  · People who are 50 years of age or older.  What are the signs or symptoms?  Symptoms of this condition usually happen when you move your head or your eyes in different directions. Symptoms may start suddenly, and they usually last for less than a minute. Symptoms may include:  · Loss of balance and falling.  · Feeling like you are spinning or moving.  · Feeling like your surroundings are spinning or moving.  · Nausea and vomiting.  · Blurred vision.  · Dizziness.  · Involuntary eye movement (nystagmus).  Symptoms can be mild and cause only slight annoyance, or they can be severe and interfere with daily life. Episodes of benign positional vertigo may return (recur) over time, and they may be triggered by certain movements. Symptoms may improve over time.  How is this diagnosed?  This condition is usually diagnosed by medical history and a physical exam of the head, neck, and ears. You may be referred to a health care provider who specializes in ear, nose, and throat (ENT) problems (otolaryngologist) or a provider who specializes in disorders of the nervous system (neurologist). You may have additional testing, including:  · MRI.  · A CT scan.  · Eye movement tests. Your health care provider may ask you to change positions quickly while he or she watches you for symptoms of benign positional vertigo, such as nystagmus. Eye movement may be tested with an electronystagmogram (ENG), caloric stimulation, the Deepwater-Hallpike test, or the roll test.  · An electroencephalogram (EEG). This records electrical activity in your brain.  · Hearing tests.  How is this treated?  Usually, your health care provider will treat this by moving your  head in specific positions to adjust your inner ear back to normal. Surgery may be needed in severe cases, but this is rare. In some cases, benign positional vertigo may resolve on its own in 2-4 weeks.  Follow these instructions at home:  Safety  · Move slowly.Avoid sudden body or head movements.  · Avoid driving.  · Avoid operating heavy machinery.  · Avoid doing any tasks that would be dangerous to you or others if a vertigo episode would occur.  · If you have trouble walking or keeping your balance, try using a cane for stability. If you feel dizzy or unstable, sit down right away.  · Return to your normal activities as told by your health care provider. Ask your health care provider what activities are safe for you.  General instructions  · Take over-the-counter and prescription medicines only as told by your health care provider.  · Avoid certain positions or movements as told by your health care provider.  · Drink enough fluid to keep your urine clear or pale yellow.  · Keep all follow-up visits as told by your health care provider. This is important.  Contact a health care provider if:  · You have a fever.  · Your condition gets worse or you develop new symptoms.  · Your family or friends notice any behavioral changes.  · Your nausea or vomiting gets worse.  · You have numbness or a “pins and needles” sensation.  Get help right away if:  · You have difficulty speaking or moving.  · You are always dizzy.  · You faint.  · You develop severe headaches.  · You have weakness in your legs or arms.  · You have changes in your hearing or vision.  · You develop a stiff neck.  · You develop sensitivity to light.  This information is not intended to replace advice given to you by your health care provider. Make sure you discuss any questions you have with your health care provider.  Document Released: 09/25/2007 Document Revised: 05/25/2017 Document Reviewed: 04/11/2016  © 2017 Elsevier

## 2020-02-28 ENCOUNTER — OFFICE VISIT (OUTPATIENT)
Dept: MEDICAL GROUP | Facility: MEDICAL CENTER | Age: 60
End: 2020-02-28
Payer: COMMERCIAL

## 2020-02-28 VITALS
DIASTOLIC BLOOD PRESSURE: 72 MMHG | BODY MASS INDEX: 30.04 KG/M2 | SYSTOLIC BLOOD PRESSURE: 124 MMHG | OXYGEN SATURATION: 97 % | TEMPERATURE: 98.2 F | HEIGHT: 60 IN | RESPIRATION RATE: 16 BRPM | HEART RATE: 68 BPM | WEIGHT: 153 LBS

## 2020-02-28 DIAGNOSIS — R73.9 BLOOD GLUCOSE ELEVATED: ICD-10-CM

## 2020-02-28 DIAGNOSIS — E03.4 HYPOTHYROIDISM DUE TO ACQUIRED ATROPHY OF THYROID: ICD-10-CM

## 2020-02-28 DIAGNOSIS — I10 ESSENTIAL HYPERTENSION: ICD-10-CM

## 2020-02-28 DIAGNOSIS — Z00.00 ROUTINE GENERAL MEDICAL EXAMINATION AT A HEALTH CARE FACILITY: ICD-10-CM

## 2020-02-28 PROCEDURE — 99396 PREV VISIT EST AGE 40-64: CPT | Performed by: NURSE PRACTITIONER

## 2020-02-28 ASSESSMENT — PATIENT HEALTH QUESTIONNAIRE - PHQ9: CLINICAL INTERPRETATION OF PHQ2 SCORE: 0

## 2020-02-28 NOTE — PROGRESS NOTES
Subjective:      Lyn Morris is a 59 y.o. female who presents with Follow-Up (4 month)        CC: Patient reports she is here today for her annual wellness exam.    HPI       1. Routine general medical examination at a health care facility  Since last seen, patient did have an episode of benign positional vertigo with symptoms now gone.  It seems to affect her about once a year.  Symptoms typically resolve within a few days and she stated she did not need to go to the vestibular clinic.  She continues to work at the [employer].    2. Blood glucose elevated  On patient's last routine blood work from July 2019, her fasting blood sugar was mildly elevated but her hemoglobin A1c was normal at 5.4.    3. Essential hypertension  Patient states her blood pressure is been well controlled on her losartan 50 mg and she has had no problems with the medicine    4. Hypothyroidism due to acquired atrophy of thyroid  Patient currently on levothyroxine 88 mcg and her last TSH was therapeutic on this dose.  Past Medical History:   Diagnosis Date   • Family history of diabetes mellitus    • Hepatitis C 2003    fibrosis, treated fall 2003, Dr. 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     Social History     Socioeconomic History   • Marital status:      Spouse name: Not on file   • Number of children: Not on file   • Years of education: Not on file   • Highest education level: Not on file   Occupational History   • Occupation: [employer], CNA     Employer: unknown   Social Needs   • Financial resource strain: Not on file   • Food insecurity     Worry: Not on file     Inability: Not on file   • Transportation needs     Medical: Not on file     Non-medical: Not on file   Tobacco Use   • Smoking status: Never Smoker   • Smokeless tobacco: Never Used   Substance and Sexual Activity   • Alcohol use: No     Alcohol/week: 0.0 oz   • Drug use: No   • Sexual activity: Yes     Partners:  Male   Lifestyle   • Physical activity     Days per week: Not on file     Minutes per session: Not on file   • Stress: Not on file   Relationships   • Social connections     Talks on phone: Not on file     Gets together: Not on file     Attends Nondenominational service: Not on file     Active member of club or organization: Not on file     Attends meetings of clubs or organizations: Not on file     Relationship status: Not on file   • Intimate partner violence     Fear of current or ex partner: Not on file     Emotionally abused: Not on file     Physically abused: Not on file     Forced sexual activity: Not on file   Other Topics Concern   • Not on file   Social History Narrative   • Not on file     Current Outpatient Medications   Medication Sig Dispense Refill   • meclizine (ANTIVERT) 25 MG Tab Take 1 Tab by mouth 3 times a day as needed. 30 Tab 0   • ondansetron (ZOFRAN) 4 MG Tab tablet Take 1 Tab by mouth every four hours as needed for Nausea/Vomiting for up to 10 days. 20 Tab 0   • levothyroxine (SYNTHROID) 88 MCG Tab TAKE 1 TABLET BY MOUTH ONCE DAILY 90 Tab 2   • losartan (COZAAR) 50 MG Tab Take 1 Tab by mouth every day. 90 Tab 3   • albuterol (VENTOLIN OR PROVENTIL) 108 (90 BASE) MCG/ACT AERS inhalation aerosol Inhale 2 Puffs by mouth every 6 hours as needed for Shortness of Breath. 8.5 g 3     No current facility-administered medications for this visit.      Family History   Problem Relation Age of Onset   • Cancer Sister         breast   • Hypertension Father    • Diabetes Father    • Hypertension Brother          Review of Systems   All other systems reviewed and are negative.         Objective:     /72 (BP Location: Right arm, Patient Position: Sitting, BP Cuff Size: Adult)   Pulse 68   Temp 36.8 °C (98.2 °F) (Temporal)   Resp 16   Ht 1.524 m (5')   Wt 69.4 kg (153 lb)   LMP 12/04/2008   SpO2 97%   BMI 29.88 kg/m²      Physical Exam  Vitals signs and nursing note reviewed.   Constitutional:        General: She is not in acute distress.     Appearance: She is well-developed. She is not diaphoretic.   HENT:      Head: Normocephalic and atraumatic.      Right Ear: External ear normal.      Left Ear: External ear normal.      Nose: Nose normal.   Eyes:      General:         Right eye: No discharge.         Left eye: No discharge.   Neck:      Musculoskeletal: Normal range of motion and neck supple.      Thyroid: No thyromegaly.   Cardiovascular:      Rate and Rhythm: Normal rate and regular rhythm.      Heart sounds: Normal heart sounds. No murmur. No friction rub. No gallop.    Pulmonary:      Effort: Pulmonary effort is normal.      Breath sounds: Normal breath sounds. No wheezing or rales.   Musculoskeletal:         General: No tenderness.   Skin:     General: Skin is warm and dry.      Findings: No rash.   Neurological:      Mental Status: She is alert and oriented to person, place, and time.      Deep Tendon Reflexes: Reflexes normal.   Psychiatric:         Behavior: Behavior normal.         Thought Content: Thought content normal.         Judgment: Judgment normal.                 Assessment/Plan:       1. Routine general medical examination at a health care facility  Patient to do lab work in July and then follow-up at the office.  Her benign positional vertigo appears to be gone and I spoke with her about how this can recur and to keep meclizine available if needed.  - Comp Metabolic Panel; Future  - Lipid Profile; Future  - CBC WITHOUT DIFFERENTIAL; Future  - HEMOGLOBIN A1C; Future    2. Blood glucose elevated  I will recheck to be sure she is not becoming prediabetic  - HEMOGLOBIN A1C; Future    3. Essential hypertension  Blood pressure appears controlled and she does not have routine dizziness with the medicine    4. Hypothyroidism due to acquired atrophy of thyroid  Patient will do lab work to be sure she is getting the right dosage of levothyroxine  - TSH; Future

## 2020-03-12 ENCOUNTER — APPOINTMENT (OUTPATIENT)
Dept: RADIOLOGY | Facility: MEDICAL CENTER | Age: 60
End: 2020-03-12
Attending: NURSE PRACTITIONER
Payer: COMMERCIAL

## 2020-03-12 DIAGNOSIS — Z12.31 ENCOUNTER FOR SCREENING MAMMOGRAM FOR MALIGNANT NEOPLASM OF BREAST: ICD-10-CM

## 2020-03-12 DIAGNOSIS — R92.8 ABNORMAL MAMMOGRAM: ICD-10-CM

## 2020-03-12 PROCEDURE — 77067 SCR MAMMO BI INCL CAD: CPT

## 2020-03-16 ENCOUNTER — HOSPITAL ENCOUNTER (OUTPATIENT)
Dept: RADIOLOGY | Facility: MEDICAL CENTER | Age: 60
End: 2020-03-16
Attending: NURSE PRACTITIONER
Payer: COMMERCIAL

## 2020-03-16 DIAGNOSIS — R92.8 ABNORMAL MAMMOGRAM: ICD-10-CM

## 2020-03-16 PROCEDURE — 76642 ULTRASOUND BREAST LIMITED: CPT | Mod: LT

## 2020-06-17 ENCOUNTER — OFFICE VISIT (OUTPATIENT)
Dept: MEDICAL GROUP | Facility: MEDICAL CENTER | Age: 60
End: 2020-06-17
Payer: COMMERCIAL

## 2020-06-17 VITALS
HEART RATE: 78 BPM | HEIGHT: 60 IN | WEIGHT: 155 LBS | OXYGEN SATURATION: 96 % | RESPIRATION RATE: 16 BRPM | SYSTOLIC BLOOD PRESSURE: 116 MMHG | TEMPERATURE: 98.8 F | DIASTOLIC BLOOD PRESSURE: 70 MMHG | BODY MASS INDEX: 30.43 KG/M2

## 2020-06-17 DIAGNOSIS — E03.4 HYPOTHYROIDISM DUE TO ACQUIRED ATROPHY OF THYROID: ICD-10-CM

## 2020-06-17 DIAGNOSIS — I10 ESSENTIAL HYPERTENSION: ICD-10-CM

## 2020-06-17 DIAGNOSIS — J45.20 MILD INTERMITTENT ASTHMA WITHOUT COMPLICATION: ICD-10-CM

## 2020-06-17 DIAGNOSIS — M79.672 LEFT FOOT PAIN: ICD-10-CM

## 2020-06-17 DIAGNOSIS — E66.9 OBESITY (BMI 30-39.9): ICD-10-CM

## 2020-06-17 PROCEDURE — 99213 OFFICE O/P EST LOW 20 MIN: CPT | Performed by: NURSE PRACTITIONER

## 2020-06-17 ASSESSMENT — FIBROSIS 4 INDEX: FIB4 SCORE: 1.75

## 2020-06-17 ASSESSMENT — ENCOUNTER SYMPTOMS: MYALGIAS: 1

## 2020-06-17 NOTE — PROGRESS NOTES
Subjective:      Lyn Morris is a 59 y.o. female who presents with Other (infected toe)        CC: Patient here today mainly for left foot concerns.    HPI         1. Left foot pain  Patient states about 5 days ago she noticed an irritation in the web between her left fourth and fifth toes.  She states there was much surrounding erythema but just mild discomfort.  She is now working in a job where she is standing through most of the shift.  She also has had some mild discomfort at the base of the left large toe which comes and goes.  She denies edema of the lower extremities.    2. Essential hypertension  Patient continues on her losartan 50 mg and blood pressure is well controlled in the office.    3. Mild intermittent asthma without complication  Patient states her asthma has not been bothering her and she has not needed to use her albuterol and has not been on a preventative inhaler.    4. Hypothyroidism due to acquired atrophy of thyroid  Patient continues on levothyroxine 88 mcg with last TSH therapeutic and she has lab work to do in 2 months.    5. Obesity (BMI 30-39.9)  Patient just into the obesity range.  Past Medical History:   Diagnosis Date   • Family history of diabetes mellitus    • Hepatitis C 2003    fibrosis, treated fall 2003, Dr. 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     Social History     Socioeconomic History   • Marital status:      Spouse name: Not on file   • Number of children: Not on file   • Years of education: Not on file   • Highest education level: Not on file   Occupational History   • Occupation: [employer], CNA     Employer: unknown   Social Needs   • Financial resource strain: Not on file   • Food insecurity     Worry: Not on file     Inability: Not on file   • Transportation needs     Medical: Not on file     Non-medical: Not on file   Tobacco Use   • Smoking status: Never Smoker   • Smokeless tobacco: Never Used    Substance and Sexual Activity   • Alcohol use: No     Alcohol/week: 0.0 oz   • Drug use: No   • Sexual activity: Yes     Partners: Male   Lifestyle   • Physical activity     Days per week: Not on file     Minutes per session: Not on file   • Stress: Not on file   Relationships   • Social connections     Talks on phone: Not on file     Gets together: Not on file     Attends Episcopalian service: Not on file     Active member of club or organization: Not on file     Attends meetings of clubs or organizations: Not on file     Relationship status: Not on file   • Intimate partner violence     Fear of current or ex partner: Not on file     Emotionally abused: Not on file     Physically abused: Not on file     Forced sexual activity: Not on file   Other Topics Concern   • Not on file   Social History Narrative   • Not on file     Current Outpatient Medications   Medication Sig Dispense Refill   • meclizine (ANTIVERT) 25 MG Tab Take 1 Tab by mouth 3 times a day as needed. 30 Tab 0   • levothyroxine (SYNTHROID) 88 MCG Tab TAKE 1 TABLET BY MOUTH ONCE DAILY 90 Tab 2   • losartan (COZAAR) 50 MG Tab Take 1 Tab by mouth every day. 90 Tab 3   • albuterol (VENTOLIN OR PROVENTIL) 108 (90 BASE) MCG/ACT AERS inhalation aerosol Inhale 2 Puffs by mouth every 6 hours as needed for Shortness of Breath. 8.5 g 3     No current facility-administered medications for this visit.      Family History   Problem Relation Age of Onset   • Cancer Sister         breast   • Hypertension Father    • Diabetes Father    • Hypertension Brother          Review of Systems   Musculoskeletal: Positive for myalgias.   All other systems reviewed and are negative.         Objective:     /70 (BP Location: Right arm, Patient Position: Sitting, BP Cuff Size: Adult)   Pulse 78   Temp 37.1 °C (98.8 °F) (Temporal)   Resp 16   Ht 1.524 m (5')   Wt 70.3 kg (155 lb)   LMP 12/04/2008   SpO2 96%   BMI 30.27 kg/m²      Physical Exam  Vitals signs and nursing  note reviewed.   Constitutional:       General: She is not in acute distress.     Appearance: She is well-developed. She is not diaphoretic.   HENT:      Head: Normocephalic and atraumatic.      Right Ear: External ear normal.      Left Ear: External ear normal.      Nose: Nose normal.   Eyes:      General:         Right eye: No discharge.         Left eye: No discharge.   Neck:      Musculoskeletal: Normal range of motion and neck supple.      Thyroid: No thyromegaly.   Cardiovascular:      Rate and Rhythm: Normal rate and regular rhythm.      Heart sounds: Normal heart sounds. No murmur. No friction rub. No gallop.    Pulmonary:      Effort: Pulmonary effort is normal.      Breath sounds: Normal breath sounds. No wheezing or rales.   Musculoskeletal:         General: No tenderness.   Feet:      Comments: There is a white macular discoloration in the webbing of the left 4th-5th toe.  There is no tenderness or redness noted.  There is a good pedal pulse.  She is able to walk without difficulty.  She reports mild tenderness in the hallux valgus area as well.  Skin:     General: Skin is warm and dry.      Findings: No rash.   Neurological:      Mental Status: She is alert and oriented to person, place, and time.      Deep Tendon Reflexes: Reflexes normal.   Psychiatric:         Behavior: Behavior normal.         Thought Content: Thought content normal.         Judgment: Judgment normal.                 Assessment/Plan:       1. Left foot pain  This appears to be a possible blister that opened and is now healing.  I do not see evidence of cellulitis and therefore have not started her on antibiotics.  She is standing for long periods of time now which is probably contributing to her issues so I will get her to podiatry to talk about comfort measures.  - REFERRAL TO PODIATRY    2. Essential hypertension  Blood pressure well controlled on current medicine    3. Mild intermittent asthma without complication  Asthma well  controlled and she does not need preventative inhalers.    4. Hypothyroidism due to acquired atrophy of thyroid  Patient to do her lab work in the next 2 months to assess her levothyroxine needs.    5. Obesity (BMI 30-39.9)    - Patient identified as having weight management issue.  Appropriate orders and counseling given.

## 2020-07-20 ENCOUNTER — HOSPITAL ENCOUNTER (OUTPATIENT)
Dept: LAB | Facility: MEDICAL CENTER | Age: 60
End: 2020-07-20
Attending: NURSE PRACTITIONER
Payer: COMMERCIAL

## 2020-07-20 DIAGNOSIS — R73.9 BLOOD GLUCOSE ELEVATED: ICD-10-CM

## 2020-07-20 DIAGNOSIS — Z00.00 ROUTINE GENERAL MEDICAL EXAMINATION AT A HEALTH CARE FACILITY: ICD-10-CM

## 2020-07-20 DIAGNOSIS — E03.4 HYPOTHYROIDISM DUE TO ACQUIRED ATROPHY OF THYROID: ICD-10-CM

## 2020-07-20 LAB
ALBUMIN SERPL BCP-MCNC: 4.3 G/DL (ref 3.2–4.9)
ALBUMIN/GLOB SERPL: 1.4 G/DL
ALP SERPL-CCNC: 112 U/L (ref 30–99)
ALT SERPL-CCNC: 17 U/L (ref 2–50)
ANION GAP SERPL CALC-SCNC: 12 MMOL/L (ref 7–16)
AST SERPL-CCNC: 21 U/L (ref 12–45)
BILIRUB SERPL-MCNC: 0.5 MG/DL (ref 0.1–1.5)
BUN SERPL-MCNC: 16 MG/DL (ref 8–22)
CALCIUM SERPL-MCNC: 8.9 MG/DL (ref 8.5–10.5)
CHLORIDE SERPL-SCNC: 105 MMOL/L (ref 96–112)
CHOLEST SERPL-MCNC: 170 MG/DL (ref 100–199)
CO2 SERPL-SCNC: 23 MMOL/L (ref 20–33)
CREAT SERPL-MCNC: 0.58 MG/DL (ref 0.5–1.4)
ERYTHROCYTE [DISTWIDTH] IN BLOOD BY AUTOMATED COUNT: 45.6 FL (ref 35.9–50)
FASTING STATUS PATIENT QL REPORTED: NORMAL
GLOBULIN SER CALC-MCNC: 3.1 G/DL (ref 1.9–3.5)
GLUCOSE SERPL-MCNC: 101 MG/DL (ref 65–99)
HCT VFR BLD AUTO: 44.1 % (ref 37–47)
HDLC SERPL-MCNC: 65 MG/DL
HGB BLD-MCNC: 15 G/DL (ref 12–16)
LDLC SERPL CALC-MCNC: 94 MG/DL
MCH RBC QN AUTO: 33.6 PG (ref 27–33)
MCHC RBC AUTO-ENTMCNC: 34 G/DL (ref 33.6–35)
MCV RBC AUTO: 98.7 FL (ref 81.4–97.8)
PLATELET # BLD AUTO: 191 K/UL (ref 164–446)
PMV BLD AUTO: 11.3 FL (ref 9–12.9)
POTASSIUM SERPL-SCNC: 4 MMOL/L (ref 3.6–5.5)
PROT SERPL-MCNC: 7.4 G/DL (ref 6–8.2)
RBC # BLD AUTO: 4.47 M/UL (ref 4.2–5.4)
SODIUM SERPL-SCNC: 140 MMOL/L (ref 135–145)
TRIGL SERPL-MCNC: 56 MG/DL (ref 0–149)
TSH SERPL DL<=0.005 MIU/L-ACNC: 0.62 UIU/ML (ref 0.38–5.33)
WBC # BLD AUTO: 4.5 K/UL (ref 4.8–10.8)

## 2020-07-20 PROCEDURE — 80061 LIPID PANEL: CPT

## 2020-07-20 PROCEDURE — 80053 COMPREHEN METABOLIC PANEL: CPT

## 2020-07-20 PROCEDURE — 83036 HEMOGLOBIN GLYCOSYLATED A1C: CPT

## 2020-07-20 PROCEDURE — 85027 COMPLETE CBC AUTOMATED: CPT

## 2020-07-20 PROCEDURE — 84443 ASSAY THYROID STIM HORMONE: CPT

## 2020-07-20 PROCEDURE — 36415 COLL VENOUS BLD VENIPUNCTURE: CPT

## 2020-07-21 LAB
EST. AVERAGE GLUCOSE BLD GHB EST-MCNC: 111 MG/DL
HBA1C MFR BLD: 5.5 % (ref 0–5.6)

## 2020-12-21 RX ORDER — LEVOTHYROXINE SODIUM 88 UG/1
TABLET ORAL
Qty: 90 TAB | Refills: 3 | Status: SHIPPED | OUTPATIENT
Start: 2020-12-21 | End: 2022-01-10 | Stop reason: SDUPTHER

## 2021-03-15 DIAGNOSIS — Z23 NEED FOR VACCINATION: ICD-10-CM

## 2021-03-30 ENCOUNTER — GYNECOLOGY VISIT (OUTPATIENT)
Dept: OBGYN | Facility: CLINIC | Age: 61
End: 2021-03-30
Payer: COMMERCIAL

## 2021-03-30 VITALS
WEIGHT: 153 LBS | DIASTOLIC BLOOD PRESSURE: 85 MMHG | SYSTOLIC BLOOD PRESSURE: 153 MMHG | HEART RATE: 80 BPM | RESPIRATION RATE: 18 BRPM | BODY MASS INDEX: 29.88 KG/M2

## 2021-03-30 DIAGNOSIS — N81.11 PROLAPSE OF VAGINAL WALL WITH MIDLINE CYSTOCELE: ICD-10-CM

## 2021-03-30 DIAGNOSIS — Z90.710 HX OF VAGINAL HYSTERECTOMY: ICD-10-CM

## 2021-03-30 PROCEDURE — 99203 OFFICE O/P NEW LOW 30 MIN: CPT | Performed by: OBSTETRICS & GYNECOLOGY

## 2021-03-30 ASSESSMENT — FIBROSIS 4 INDEX: FIB4 SCORE: 1.57

## 2021-03-30 NOTE — PROGRESS NOTES
Subjective:      Lyn Morris is a 60 y.o. female who presents as New Patient (Vaginal Bulge )            HPI patient is a 60-year-old who presents today with complaints of vaginal bulge and pressure.  Symptoms have been present for a month.  States symptoms is likely worsening.  She feels a bulge with certain activities.  Denies bleeding or discharge.  She reports that for the past month also, sometimes when she urinates she is unable to empty her bladder completely and after she gets up from the toilet, she have to sit down back again in order to empty her bladder completely.  Has not done any splinting to aid with voiding.    Reports history of hysterectomy with anterior and posterior repair.  Computer review states patient had surgery done in 2008.  Patient is unsure whether her ovaries were removed or not.  Reports bowel function is unchanged.    Patient does strenuous activities but has not changed her activities recently.  She does patient transport in hospital.    ROS all organ systems were reviewed and were negative except for complaints in HPI       Objective:     LMP 12/04/2008      Physical Exam  Vitals and nursing note reviewed. Exam conducted with a chaperone present.   Constitutional:       General: She is not in acute distress.     Appearance: Normal appearance. She is obese. She is not toxic-appearing.   HENT:      Head: Normocephalic and atraumatic.   Eyes:      General: No scleral icterus.        Right eye: No discharge.         Left eye: No discharge.      Conjunctiva/sclera: Conjunctivae normal.   Cardiovascular:      Rate and Rhythm: Normal rate and regular rhythm.      Pulses: Normal pulses.      Heart sounds: Normal heart sounds. No murmur. No gallop.    Pulmonary:      Effort: Pulmonary effort is normal. No respiratory distress.      Breath sounds: Normal breath sounds. No wheezing or rales.   Abdominal:      General: Abdomen is flat. There is no distension.      Palpations: Abdomen is  soft.      Tenderness: There is no abdominal tenderness.   Genitourinary:     General: Normal vulva.      Labia:         Right: No rash, tenderness, lesion or injury.         Left: No rash, tenderness, lesion or injury.       Urethra: No prolapse.      Uterus: Absent.       Adnexa:         Right: No tenderness or fullness.          Left: No tenderness or fullness.        Rectum: No external hemorrhoid.      Comments: Atrophic appearing external genitalia without rashes or lesions.  Atrophic vaginal mucosa without lesions bleeding or discharge.  Stage III cystocele present with some weakness and apical support  Musculoskeletal:         General: No swelling. Normal range of motion.      Cervical back: Normal range of motion and neck supple. No rigidity.      Right lower leg: No edema.      Left lower leg: No edema.   Lymphadenopathy:      Cervical: No cervical adenopathy.      Lower Body: No right inguinal adenopathy. No left inguinal adenopathy.   Skin:     General: Skin is warm and dry.      Coloration: Skin is not jaundiced.      Findings: No bruising.   Neurological:      General: No focal deficit present.      Mental Status: She is alert and oriented to person, place, and time.      Motor: No weakness.      Gait: Gait normal.   Psychiatric:         Mood and Affect: Mood normal.         Behavior: Behavior normal.         Thought Content: Thought content normal.         Judgment: Judgment normal.          Discussion:    I discussed findings on exam.  Patient has had previous anterior and posterior repair in 2018 hysterectomy.  I discussed limitation of having to redo surgery with previous repair.  I discussed treatment options that I can refer her to another physician in the group to see if she is a candidate for apical support repair, I discussed option for pessary and also option for vaginal closure procedure.  Patient is not sexually active and is unsure whether she will be sexually active in the future.  After  discussing the pros and cons and risk and benefits were all of these treatment options,  Patient states she is willing to try pessary.  She will make appointment for pessary fitting and pessary fitting appointment procedure was discussed.  All questions and concerns were addressed to patient satisfaction.       Assessment/Plan:        1. Prolapse of vaginal wall with midline cystocele  60-year-old postmenopausal female with prolapse of vaginal wall with cystocele.  Has history of vaginal hysterectomy with AP repair.  Findings were discussed and possible treatment options were reviewed.  She desires to try pessary fitting and she will make appointment for pessary fitting procedure    2. Hx of vaginal hysterectomy  Reports history of vaginal hysterectomy for benign condition with her previous abnormal Paps.  Unsure of what her ovaries were removed.  Adnexa were not palpable during exam    3.  Precautions and plan of care were reviewed.  Patient to follow-up for pessary fitting

## 2021-04-01 ENCOUNTER — HOSPITAL ENCOUNTER (OUTPATIENT)
Dept: RADIOLOGY | Facility: MEDICAL CENTER | Age: 61
End: 2021-04-01
Attending: NURSE PRACTITIONER
Payer: COMMERCIAL

## 2021-04-01 DIAGNOSIS — Z12.31 VISIT FOR SCREENING MAMMOGRAM: ICD-10-CM

## 2021-04-30 ENCOUNTER — GYNECOLOGY VISIT (OUTPATIENT)
Dept: OBGYN | Facility: CLINIC | Age: 61
End: 2021-04-30
Payer: COMMERCIAL

## 2021-04-30 VITALS — WEIGHT: 146 LBS | DIASTOLIC BLOOD PRESSURE: 81 MMHG | SYSTOLIC BLOOD PRESSURE: 150 MMHG | BODY MASS INDEX: 28.51 KG/M2

## 2021-04-30 DIAGNOSIS — N81.11 PROLAPSE OF VAGINAL WALL WITH MIDLINE CYSTOCELE: ICD-10-CM

## 2021-04-30 DIAGNOSIS — Z46.89 ENCOUNTER FOR FITTING AND ADJUSTMENT OF PESSARY: ICD-10-CM

## 2021-04-30 DIAGNOSIS — Z90.710 HX OF VAGINAL HYSTERECTOMY: ICD-10-CM

## 2021-04-30 PROCEDURE — 57160 INSERT PESSARY/OTHER DEVICE: CPT | Performed by: OBSTETRICS & GYNECOLOGY

## 2021-04-30 PROCEDURE — A4561 PESSARY RUBBER, ANY TYPE: HCPCS | Performed by: OBSTETRICS & GYNECOLOGY

## 2021-04-30 PROCEDURE — 99213 OFFICE O/P EST LOW 20 MIN: CPT | Mod: 25 | Performed by: OBSTETRICS & GYNECOLOGY

## 2021-04-30 RX ORDER — OXYQUINOLINE/BORIC ACID 0.025 %
JELLY WITH APPLICATOR (GRAM) VAGINAL
Qty: 113.4 G | Refills: 3 | Status: SHIPPED | OUTPATIENT
Start: 2021-04-30 | End: 2021-05-13 | Stop reason: SDUPTHER

## 2021-04-30 ASSESSMENT — FIBROSIS 4 INDEX: FIB4 SCORE: 1.57

## 2021-04-30 NOTE — PROGRESS NOTES
Subjective:      Lyn Morris is a 60 y.o. female who presents for pessary fitting            HPI patient is a 60-year-old with history of hysterectomy who presents today for pessary fitting.  Patient has a traumatic stage 3 midline cystocele and desires trial of pessary.  Patient reports no change in her symptoms since her last visit and would like to be fitted for pessary.  I had explained pessary fitting previously with patient and patient agrees to proceed at this point    ROS all organ systems were reviewed and were negative except for complaints of pelvic pressure and pelvic organ prolapse       Objective:     /81 (BP Location: Right arm, Patient Position: Sitting)   Wt 66.2 kg (146 lb)   LMP 12/04/2008   BMI 28.51 kg/m²      Physical Exam  Vitals and nursing note reviewed. Exam conducted with a chaperone present.   Constitutional:       Appearance: Normal appearance.   Cardiovascular:      Rate and Rhythm: Normal rate and regular rhythm.      Pulses: Normal pulses.      Heart sounds: Normal heart sounds. No murmur. No gallop.    Pulmonary:      Effort: Pulmonary effort is normal. No respiratory distress.      Breath sounds: Normal breath sounds. No wheezing.   Abdominal:      General: Abdomen is flat. Bowel sounds are normal. There is no distension.      Palpations: Abdomen is soft.      Tenderness: There is no abdominal tenderness.   Genitourinary:     Labia:         Right: No rash, tenderness, lesion or injury.         Left: No rash, tenderness, lesion or injury.           Comments: Atrophic vaginal mucosa noted with no bleeding or discharge.  Stage III cystocele present  Musculoskeletal:      Cervical back: Normal range of motion and neck supple.   Skin:     General: Skin is warm.      Findings: No lesion or rash.   Neurological:      General: No focal deficit present.      Mental Status: She is alert and oriented to person, place, and time.      Cranial Nerves: No cranial nerve deficit.       Motor: No weakness.   Psychiatric:         Mood and Affect: Mood normal.         Behavior: Behavior normal.         Thought Content: Thought content normal.         Judgment: Judgment normal.          Procedure:  Pessary refitting procedure was discussed with patient and patient verbally consented.  Patient was fitted with size 2 pessary with support pessary was too small.  Patient was then fitted with size 3 pessary with support which she tolerated well and was able to ambulate, Valsalva and void without difficulty.  She had no pain or tenderness with IUD and desires to size 3 ring pessary with support which was placed today.  We discussed use of Trimo Chavez gel weekly and prescription was given.  Patient will follow up in 4 to 6 weeks for reevaluation.  Pessary precautions were discussed         Assessment/Plan:        1. Prolapse of vaginal wall with midline cystocele  Patient desires to use vaginal pessary for symptoms and was fitted with size 3 pessary with support.  Patient tolerated procedure well.  Pessary care and precautions were reviewed    2. Hx of vaginal hysterectomy      3. Encounter for fitting and adjustment of pessary  Patient was fitted with size 3 pessary with support.  She will follow-up in 4 to 6 weeks for evaluation  Rx TrimoSan given

## 2021-05-06 ENCOUNTER — TELEPHONE (OUTPATIENT)
Dept: OBGYN | Facility: CLINIC | Age: 61
End: 2021-05-06

## 2021-05-06 NOTE — TELEPHONE ENCOUNTER
Patient called because she got an RX for Trimo-Chavez. Pt states that her pharmacy told her that the RX needs to be special ordered. Advised pt that I would have to send a message to the provider. No further questions.

## 2021-05-13 DIAGNOSIS — Z46.89 ENCOUNTER FOR FITTING AND ADJUSTMENT OF PESSARY: ICD-10-CM

## 2021-05-13 RX ORDER — OXYQUINOLINE/BORIC ACID 0.025 %
JELLY WITH APPLICATOR (GRAM) VAGINAL
Qty: 113.4 G | Refills: 3 | Status: SHIPPED | OUTPATIENT
Start: 2021-05-13 | End: 2021-08-26

## 2021-06-03 ENCOUNTER — GYNECOLOGY VISIT (OUTPATIENT)
Dept: OBGYN | Facility: CLINIC | Age: 61
End: 2021-06-03
Payer: COMMERCIAL

## 2021-06-03 VITALS — BODY MASS INDEX: 28.32 KG/M2 | SYSTOLIC BLOOD PRESSURE: 160 MMHG | DIASTOLIC BLOOD PRESSURE: 84 MMHG | WEIGHT: 145 LBS

## 2021-06-03 DIAGNOSIS — M54.50 LOW BACK PAIN WITHOUT SCIATICA, UNSPECIFIED BACK PAIN LATERALITY, UNSPECIFIED CHRONICITY: ICD-10-CM

## 2021-06-03 DIAGNOSIS — Z46.89 ENCOUNTER FOR FITTING AND ADJUSTMENT OF PESSARY: ICD-10-CM

## 2021-06-03 DIAGNOSIS — N39.0 URINARY TRACT INFECTION WITHOUT HEMATURIA, SITE UNSPECIFIED: ICD-10-CM

## 2021-06-03 LAB
APPEARANCE UR: CLEAR
BILIRUB UR STRIP-MCNC: NORMAL MG/DL
COLOR UR AUTO: YELLOW
GLUCOSE UR STRIP.AUTO-MCNC: NEGATIVE MG/DL
KETONES UR STRIP.AUTO-MCNC: NEGATIVE MG/DL
LEUKOCYTE ESTERASE UR QL STRIP.AUTO: NORMAL
NITRITE UR QL STRIP.AUTO: NEGATIVE
PH UR STRIP.AUTO: 6 [PH] (ref 5–8)
PROT UR QL STRIP: NEGATIVE MG/DL
RBC UR QL AUTO: NEGATIVE
SP GR UR STRIP.AUTO: >=1.03
UROBILINOGEN UR STRIP-MCNC: NORMAL MG/DL

## 2021-06-03 PROCEDURE — 99213 OFFICE O/P EST LOW 20 MIN: CPT | Performed by: OBSTETRICS & GYNECOLOGY

## 2021-06-03 PROCEDURE — 81002 URINALYSIS NONAUTO W/O SCOPE: CPT | Performed by: OBSTETRICS & GYNECOLOGY

## 2021-06-03 RX ORDER — SULFAMETHOXAZOLE AND TRIMETHOPRIM 400; 80 MG/1; MG/1
1 TABLET ORAL 2 TIMES DAILY
Qty: 10 TABLET | Refills: 0 | Status: SHIPPED | OUTPATIENT
Start: 2021-06-03 | End: 2021-06-17

## 2021-06-03 ASSESSMENT — FIBROSIS 4 INDEX: FIB4 SCORE: 1.57

## 2021-06-03 NOTE — PROGRESS NOTES
Subjective:      Lyn Morris is a 60 y.o. female who presents for Gynecologic Exam (Pessary check)            HPI patient is a 60-year-old symptom medic pelvic organ prolapse who presents today for pessary care.  She reports her symptoms are well controlled with pessary and she has not had any pelvic symptoms bleeding or discharge.  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  She denies any fevers or dysuria.  She states her urinary leakage symptoms have improved a lot since her pessary usage.  She was prescribed Trimo-Chavez and is still awaiting from [facility] for delivery    ROS all organ system reviewed and are negative except for complaints in HPI       Objective:     /84 (BP Location: Right arm, Patient Position: Sitting, BP Cuff Size: Adult)   Wt 65.8 kg (145 lb)   LMP 12/04/2008   BMI 28.32 kg/m²      Physical Exam  Vitals and nursing note reviewed. Exam conducted with a chaperone present.   Constitutional:       General: She is not in acute distress.     Appearance: Normal appearance. She is not toxic-appearing.   Eyes:      General:         Right eye: No discharge.         Left eye: No discharge.      Conjunctiva/sclera: Conjunctivae normal.   Cardiovascular:      Rate and Rhythm: Normal rate.      Pulses: Normal pulses.      Heart sounds: Normal heart sounds. No murmur heard.     Pulmonary:      Effort: Pulmonary effort is normal. No respiratory distress.      Breath sounds: Normal breath sounds. No wheezing.   Abdominal:      General: Abdomen is flat. Bowel sounds are normal. There is no distension.      Palpations: Abdomen is soft.      Tenderness: There is no abdominal tenderness.   Genitourinary:     General: Normal vulva.      Vagina: No vaginal discharge.      Comments: Vaginal mucosa is without any ulceration bleeding or discharge  Musculoskeletal:         General: Normal range of motion.       Cervical back: Normal range of motion and neck supple. No rigidity.      Right lower leg: No edema.      Left lower leg: No edema.   Lymphadenopathy:      Cervical: No cervical adenopathy.   Skin:     General: Skin is warm and dry.      Coloration: Skin is not jaundiced.      Findings: No bruising.   Neurological:      General: No focal deficit present.      Mental Status: She is alert and oriented to person, place, and time.      Motor: No weakness.      Gait: Gait normal.   Psychiatric:         Mood and Affect: Mood normal.         Behavior: Behavior normal.         Thought Content: Thought content normal.         Judgment: Judgment normal.                   Urinalysis shows moderate leukocytes          Procedure:    Pessary was removed and vaginal vault was inspected.  No bleeding ulceration lesions or discharge was present.  Ring pessary size 3 with support was refitted.  Patient to continue weekly Trimo-Chavez usage.  Precautions reviewed     Assessment/Plan:        1. Encounter for fitting and adjustment of pessary  Pelvic organ prolapse symptoms are well controlled with pessary usage.  Pessary was cleaned and refitted and patient can follow-up in 3 months for reevaluation.  She will call us if she has any problems or abnormal symptoms    2. Low back pain without sciatica, unspecified back pain laterality, unspecified chronicity  We discussed low back pain and possible etiology.  Patient does strenuous work.  We discussed comfort measures  - POCT Urinalysis    3. Urinary tract infection without hematuria, site unspecified  Urine analysis discussed.  Since patient has symptoms and a lot of leukocytes we discussed treatment and patient agrees  - sulfamethoxazole-trimethoprim (BACTRIM) 400-80 MG Tab; Take 1 tablet by mouth 2 times a day.  Dispense: 10 tablet; Refill: 0

## 2021-06-03 NOTE — NON-PROVIDER
Pt here for pessary check  Phone: [phone]  Pharmacy verified   Pt c/o lower back pain, unsure if related to pessary

## 2021-06-17 ENCOUNTER — OFFICE VISIT (OUTPATIENT)
Dept: MEDICAL GROUP | Facility: MEDICAL CENTER | Age: 61
End: 2021-06-17
Payer: COMMERCIAL

## 2021-06-17 VITALS
WEIGHT: 145 LBS | RESPIRATION RATE: 16 BRPM | HEART RATE: 72 BPM | BODY MASS INDEX: 28.47 KG/M2 | SYSTOLIC BLOOD PRESSURE: 114 MMHG | TEMPERATURE: 97.3 F | OXYGEN SATURATION: 98 % | DIASTOLIC BLOOD PRESSURE: 70 MMHG | HEIGHT: 60 IN

## 2021-06-17 DIAGNOSIS — J45.20 MILD INTERMITTENT ASTHMA, UNCOMPLICATED: ICD-10-CM

## 2021-06-17 DIAGNOSIS — E03.4 HYPOTHYROIDISM DUE TO ACQUIRED ATROPHY OF THYROID: ICD-10-CM

## 2021-06-17 DIAGNOSIS — M25.661 KNEE JOINT STIFFNESS, BILATERAL: ICD-10-CM

## 2021-06-17 DIAGNOSIS — M25.662 KNEE JOINT STIFFNESS, BILATERAL: ICD-10-CM

## 2021-06-17 DIAGNOSIS — G89.29 CHRONIC PAIN OF BOTH KNEES: ICD-10-CM

## 2021-06-17 DIAGNOSIS — R73.01 ELEVATED FASTING GLUCOSE: ICD-10-CM

## 2021-06-17 DIAGNOSIS — M25.562 CHRONIC PAIN OF BOTH KNEES: ICD-10-CM

## 2021-06-17 DIAGNOSIS — M25.561 CHRONIC PAIN OF BOTH KNEES: ICD-10-CM

## 2021-06-17 PROBLEM — Z96.0 VAGINAL PESSARY PRESENT: Status: ACTIVE | Noted: 2021-06-17

## 2021-06-17 PROCEDURE — 99214 OFFICE O/P EST MOD 30 MIN: CPT | Performed by: FAMILY MEDICINE

## 2021-06-17 RX ORDER — ALBUTEROL SULFATE 90 UG/1
2 AEROSOL, METERED RESPIRATORY (INHALATION) EVERY 6 HOURS PRN
Qty: 8.5 G | Refills: 3 | Status: SHIPPED | OUTPATIENT
Start: 2021-06-17 | End: 2022-09-29 | Stop reason: SDUPTHER

## 2021-06-17 ASSESSMENT — PATIENT HEALTH QUESTIONNAIRE - PHQ9: CLINICAL INTERPRETATION OF PHQ2 SCORE: 0

## 2021-06-17 ASSESSMENT — FIBROSIS 4 INDEX: FIB4 SCORE: 1.57

## 2021-06-17 NOTE — PROGRESS NOTES
Chief Complaint   Patient presents with   • Knee Pain       Subjective:     HPI:   Lyn Morris presents today with the followin. Knee joint stiffness, bilateral/Chronic pain of both knees  Patient presents with a little over 2 months of knee stiffness and pain.  She does not notice this while walking but notices this after she has been sitting for a while and gets up to walk.  On examination her hamstrings are very tight and she does have some mild crepitus.  There are no significant effusions and no instability to the knees appreciated.  X-ray orders discussed and placed.  Stretching regimen discussed.  Discussed with patient that if this is not adequate physical therapy may be very helpful.    2. Mild intermittent asthma, uncomplicated  Her albuterol inhaler is renewed.  She uses it quite rarely, mostly during the fire season once a month at most except during fire season when of course she has to use it much more frequently.  Denies current wheezing or shortness of breath, lung exam is normal.    3. Elevated fasting glucose  Patient's last blood test showed mild elevated fasting glucose.  There is a family history of diabetes.  Lab orders discussed and placed.    4. Hypothyroidism due to acquired atrophy of thyroid  Patient's last thyroid result in July of last year was at target but she is due for retesting soon.  Lab order discussed and placed.      Patient works for the [employer].  She has been offered Covid vaccine but has been reluctant.  Have discussed this briefly with her and have advised her to obtain vaccine.  This is especially important as [place]            Patient Active Problem List    Diagnosis Date Noted   • Vaginal pessary present 2021   • Prolapse of vaginal wall with midline cystocele 2021   • Hx of vaginal hysterectomy 2021   • Obesity (BMI 30-39.9) 2020   • Hypothyroidism due to acquired atrophy of  thyroid 01/24/2019   • Liver cyst 09/28/2017   • Seasonal allergies 06/17/2016   • Mild intermittent asthma, uncomplicated 01/16/2015   • Urinary incontinence 01/16/2015   • History of hepatitis C 11/29/2010       Current medicines (including changes today)  Current Outpatient Medications   Medication Sig Dispense Refill   • albuterol 108 (90 Base) MCG/ACT Aero Soln inhalation aerosol Inhale 2 Puffs every 6 hours as needed for Shortness of Breath. 8.5 g 3   • OXYQUINOLONE SULFATE VAGINAL (TRIMO-VILLATORO) 0.025 % Gel Use half an applicator per vagina weekly (Patient not taking: Reported on 6/3/2021) 113.4 g 3   • levothyroxine (SYNTHROID) 88 MCG Tab Take 1 tablet by mouth once daily 90 Tab 3     No current facility-administered medications for this visit.       No Known Allergies    ROS: As per HPI       Objective:     /70   Pulse 72   Temp 36.3 °C (97.3 °F)   Resp 16   Ht 1.524 m (5')   Wt 65.8 kg (145 lb)   SpO2 98%  Body mass index is 28.32 kg/m².    Physical Exam:  Constitutional: Well-developed and well-nourished. Not diaphoretic. No distress. Lucid and fluent. Patient, physician and staff all wearing masks.  Skin: Skin is warm and dry. No rash noted.  Head: Atraumatic without lesions.  Eyes: Conjunctivae and extraocular motions are normal. Pupils are equal, round, and reactive to light. No scleral icterus.   Ears:  External ears unremarkable. .  Neck: Supple, trachea midline. No thyromegaly present. No cervical or supraclavicular lymphadenopathy. No JVD or carotid bruits appreciated  Cardiovascular: Regular rate and rhythm.  Normal S1, S2 without murmur appreciated.  Chest: Effort normal. Clear to auscultation throughout. No adventitious sounds.   Abdomen: Soft, non tender, and without distention. Active bowel sounds in all four quadrants. No rebound, guarding, masses or hepatosplenomegaly.  Extremities: No cyanosis, clubbing, erythema, nor edema.  Mild left knee crepitus, no effusions.  Hamstrings are  very tight bilaterally.  Neurological: Alert and oriented x 3. No tremor noted.  Psychiatric:  Behavior, mood, and affect are appropriate.       Assessment and Plan:     60 y.o. female with the following issues:    1. Knee joint stiffness, bilateral  DX-KNEE COMPLETE 4+ RIGHT    DX-KNEE COMPLETE 4+ LEFT   2. Chronic pain of both knees  DX-KNEE COMPLETE 4+ RIGHT    DX-KNEE COMPLETE 4+ LEFT   3. Mild intermittent asthma, uncomplicated  albuterol 108 (90 Base) MCG/ACT Aero Soln inhalation aerosol    Comp Metabolic Panel    CBC WITHOUT DIFFERENTIAL   4. Elevated fasting glucose  Comp Metabolic Panel    Lipid Profile    HEMOGLOBIN A1C   5. Hypothyroidism due to acquired atrophy of thyroid  Lipid Profile    TSH         Followup: Return in about 6 months (around 12/17/2021), or if symptoms worsen or fail to improve.

## 2021-06-18 ENCOUNTER — HOSPITAL ENCOUNTER (OUTPATIENT)
Dept: LAB | Facility: MEDICAL CENTER | Age: 61
End: 2021-06-18
Attending: PHYSICIAN ASSISTANT
Payer: COMMERCIAL

## 2021-06-18 DIAGNOSIS — E03.4 HYPOTHYROIDISM DUE TO ACQUIRED ATROPHY OF THYROID: ICD-10-CM

## 2021-06-18 DIAGNOSIS — R73.01 ELEVATED FASTING GLUCOSE: ICD-10-CM

## 2021-06-18 DIAGNOSIS — J45.20 MILD INTERMITTENT ASTHMA, UNCOMPLICATED: ICD-10-CM

## 2021-06-18 LAB
ALBUMIN SERPL BCP-MCNC: 4.4 G/DL (ref 3.2–4.9)
ALBUMIN/GLOB SERPL: 1.5 G/DL
ALP SERPL-CCNC: 104 U/L (ref 30–99)
ALT SERPL-CCNC: 15 U/L (ref 2–50)
ANION GAP SERPL CALC-SCNC: 11 MMOL/L (ref 7–16)
AST SERPL-CCNC: 17 U/L (ref 12–45)
BILIRUB SERPL-MCNC: 0.5 MG/DL (ref 0.1–1.5)
BUN SERPL-MCNC: 14 MG/DL (ref 8–22)
CALCIUM SERPL-MCNC: 9 MG/DL (ref 8.5–10.5)
CHLORIDE SERPL-SCNC: 106 MMOL/L (ref 96–112)
CHOLEST SERPL-MCNC: 172 MG/DL (ref 100–199)
CO2 SERPL-SCNC: 24 MMOL/L (ref 20–33)
CREAT SERPL-MCNC: 0.67 MG/DL (ref 0.5–1.4)
ERYTHROCYTE [DISTWIDTH] IN BLOOD BY AUTOMATED COUNT: 48.7 FL (ref 35.9–50)
EST. AVERAGE GLUCOSE BLD GHB EST-MCNC: 108 MG/DL
FASTING STATUS PATIENT QL REPORTED: NORMAL
GLOBULIN SER CALC-MCNC: 3 G/DL (ref 1.9–3.5)
GLUCOSE SERPL-MCNC: 98 MG/DL (ref 65–99)
HBA1C MFR BLD: 5.4 % (ref 4–5.6)
HCT VFR BLD AUTO: 43.9 % (ref 37–47)
HDLC SERPL-MCNC: 63 MG/DL
HGB BLD-MCNC: 14.4 G/DL (ref 12–16)
LDLC SERPL CALC-MCNC: 99 MG/DL
MCH RBC QN AUTO: 33 PG (ref 27–33)
MCHC RBC AUTO-ENTMCNC: 32.8 G/DL (ref 33.6–35)
MCV RBC AUTO: 100.7 FL (ref 81.4–97.8)
PLATELET # BLD AUTO: 224 K/UL (ref 164–446)
PMV BLD AUTO: 11.1 FL (ref 9–12.9)
POTASSIUM SERPL-SCNC: 3.9 MMOL/L (ref 3.6–5.5)
PROT SERPL-MCNC: 7.4 G/DL (ref 6–8.2)
RBC # BLD AUTO: 4.36 M/UL (ref 4.2–5.4)
SODIUM SERPL-SCNC: 141 MMOL/L (ref 135–145)
TRIGL SERPL-MCNC: 50 MG/DL (ref 0–149)
TSH SERPL DL<=0.005 MIU/L-ACNC: 0.46 UIU/ML (ref 0.38–5.33)
WBC # BLD AUTO: 8.6 K/UL (ref 4.8–10.8)

## 2021-06-18 PROCEDURE — 80061 LIPID PANEL: CPT

## 2021-06-18 PROCEDURE — 85027 COMPLETE CBC AUTOMATED: CPT

## 2021-06-18 PROCEDURE — 84443 ASSAY THYROID STIM HORMONE: CPT

## 2021-06-18 PROCEDURE — 83036 HEMOGLOBIN GLYCOSYLATED A1C: CPT

## 2021-06-18 PROCEDURE — 80053 COMPREHEN METABOLIC PANEL: CPT

## 2021-06-18 PROCEDURE — 36415 COLL VENOUS BLD VENIPUNCTURE: CPT

## 2021-06-19 ENCOUNTER — HOSPITAL ENCOUNTER (OUTPATIENT)
Dept: RADIOLOGY | Facility: MEDICAL CENTER | Age: 61
End: 2021-06-19
Attending: FAMILY MEDICINE
Payer: COMMERCIAL

## 2021-06-19 DIAGNOSIS — M25.562 CHRONIC PAIN OF BOTH KNEES: ICD-10-CM

## 2021-06-19 DIAGNOSIS — G89.29 CHRONIC PAIN OF BOTH KNEES: ICD-10-CM

## 2021-06-19 DIAGNOSIS — M25.661 KNEE JOINT STIFFNESS, BILATERAL: ICD-10-CM

## 2021-06-19 DIAGNOSIS — M25.662 KNEE JOINT STIFFNESS, BILATERAL: ICD-10-CM

## 2021-06-19 DIAGNOSIS — M25.561 CHRONIC PAIN OF BOTH KNEES: ICD-10-CM

## 2021-06-19 PROCEDURE — 73564 X-RAY EXAM KNEE 4 OR MORE: CPT | Mod: LT

## 2021-06-19 PROCEDURE — 73564 X-RAY EXAM KNEE 4 OR MORE: CPT | Mod: RT

## 2021-07-20 ENCOUNTER — HOSPITAL ENCOUNTER (OUTPATIENT)
Dept: RADIOLOGY | Facility: MEDICAL CENTER | Age: 61
End: 2021-07-20
Attending: STUDENT IN AN ORGANIZED HEALTH CARE EDUCATION/TRAINING PROGRAM
Payer: COMMERCIAL

## 2021-07-20 ENCOUNTER — OFFICE VISIT (OUTPATIENT)
Dept: MEDICAL GROUP | Facility: MEDICAL CENTER | Age: 61
End: 2021-07-20
Payer: COMMERCIAL

## 2021-07-20 VITALS
HEIGHT: 60 IN | WEIGHT: 149.2 LBS | BODY MASS INDEX: 29.29 KG/M2 | SYSTOLIC BLOOD PRESSURE: 154 MMHG | TEMPERATURE: 96.8 F | RESPIRATION RATE: 16 BRPM | OXYGEN SATURATION: 98 % | DIASTOLIC BLOOD PRESSURE: 82 MMHG | HEART RATE: 72 BPM

## 2021-07-20 DIAGNOSIS — M25.562 CHRONIC PAIN OF BOTH KNEES: ICD-10-CM

## 2021-07-20 DIAGNOSIS — M54.50 ACUTE BILATERAL LOW BACK PAIN WITHOUT SCIATICA: ICD-10-CM

## 2021-07-20 DIAGNOSIS — G89.29 CHRONIC PAIN OF BOTH KNEES: ICD-10-CM

## 2021-07-20 DIAGNOSIS — M25.561 CHRONIC PAIN OF BOTH KNEES: ICD-10-CM

## 2021-07-20 PROCEDURE — 99213 OFFICE O/P EST LOW 20 MIN: CPT | Performed by: STUDENT IN AN ORGANIZED HEALTH CARE EDUCATION/TRAINING PROGRAM

## 2021-07-20 PROCEDURE — 73521 X-RAY EXAM HIPS BI 2 VIEWS: CPT

## 2021-07-20 PROCEDURE — 72100 X-RAY EXAM L-S SPINE 2/3 VWS: CPT

## 2021-07-20 RX ORDER — CYCLOBENZAPRINE HCL 10 MG
10 TABLET ORAL 2 TIMES DAILY PRN
Qty: 30 TABLET | Refills: 0 | Status: SHIPPED | OUTPATIENT
Start: 2021-07-20 | End: 2021-11-24

## 2021-07-20 ASSESSMENT — FIBROSIS 4 INDEX: FIB4 SCORE: 1.18

## 2021-07-20 NOTE — PROGRESS NOTES
Subjective:     CC: Back pain    HPI:   Lyn presents today with     Back pain  Patient presents with lower back pain that has been present for several years. Patient states that before it would fluctuate and come and go but for the last several weeks it has been constant. Patient states that it is worse on the right side and radiates around the front. Patient describes the pain as pressure. Patient states that it is worse with rest due to becoming stiff. Patient has tried ibuprofen and muscle relaxant with some relief. Patient states that the pain does not radiate. Patient does not remember any trauma or injury.    Knee pain  At last visit presented with concern about bilateral knee pain. Patient also described the knee pain as stiffness that was worse after resting. Review of knee x-rays does show degenerative changes.    Current Outpatient Medications Ordered in Epic   Medication Sig Dispense Refill   • cyclobenzaprine (FLEXERIL) 10 mg Tab Take 1 tablet by mouth 2 times a day as needed for Moderate Pain or Muscle Spasms. 30 tablet 0   • albuterol 108 (90 Base) MCG/ACT Aero Soln inhalation aerosol Inhale 2 Puffs every 6 hours as needed for Shortness of Breath. 8.5 g 3   • levothyroxine (SYNTHROID) 88 MCG Tab Take 1 tablet by mouth once daily 90 Tab 3   • OXYQUINOLONE SULFATE VAGINAL (TRIMO-VILLATORO) 0.025 % Gel Use half an applicator per vagina weekly (Patient not taking: Reported on 6/3/2021) 113.4 g 3     No current [facility]-ordered facility-administered medications on file.       Health Maintenance: Completed    ROS:  Gen: no fevers/chills  ENT: no sore throat  Pulm: no sob, no cough  CV: no chest pain, no palpitations  GI: no nausea/vomiting, no diarrhea  : no dysuria  MSk: no myalgias  Skin: no rash  Neuro: no headaches, no numbness/tingling      Objective:     Exam:  /82 (BP Location: Right arm, Patient Position: Sitting, BP Cuff Size: Adult)   Pulse 72   Temp 36 °C (96.8 °F)   Resp 16   Ht 1.524 m (5')    Wt 67.7 kg (149 lb 3.2 oz)   LMP 12/04/2008   SpO2 98%   BMI 29.14 kg/m²  Body mass index is 29.14 kg/m².    Gen: Alert and oriented, No apparent distress.  Neck: Neck is supple without lymphadenopathy.  Lungs: Normal effort, CTA bilaterally, no wheezes, rhonchi, or rales  CV: Regular rate and rhythm. No murmurs, rubs, or gallops.  Ext: No clubbing, cyanosis, edema.      Assessment & Plan:     61 y.o. female with the following -     1. Acute bilateral low back pain without sciatica  Acute, stable.  Patient with bilateral low back pain without sciatica.  Patient states that this has been going on for several years but only worsening over the last week.  Patient provided a handout with stretches.  X-ray ordered and shows degenerative endplate spurring in the lumbar spine the majority of displaces remain preserved.  Patient's pain is likely due to musculature.  Patient provided cyclobenzaprine to use as needed.  Discussed that this medication may cause drowsiness and only use when she does not need to be alert.  Referral placed to physical therapy.  - DX-LUMBAR SPINE-2 OR 3 VIEWS; Future  - cyclobenzaprine (FLEXERIL) 10 mg Tab; Take 1 tablet by mouth 2 times a day as needed for Moderate Pain or Muscle Spasms.  Dispense: 30 tablet; Refill: 0  - REFERRAL TO PHYSICAL THERAPY  - DX-HIP-BILATERAL-WITH PELVIS-2 VIEWS; Future    2. Chronic pain of both knees  Chronic, stable.  Patient with recent x-rays of bilateral knees showing degenerative changes.  Referral placed to physical therapy.  Knee pain may be contributing to bilateral low back pain.  Patient agreeable to plan.      Return in about 3 months (around 10/20/2021).    Please note that this dictation was created using voice recognition software. I have made every reasonable attempt to correct obvious errors, but I expect that there are errors of grammar and possibly content that I did not discover before finalizing the note.

## 2021-08-16 VITALS — HEART RATE: 78 BPM | DIASTOLIC BLOOD PRESSURE: 85 MMHG | SYSTOLIC BLOOD PRESSURE: 143 MMHG

## 2021-08-26 ENCOUNTER — OFFICE VISIT (OUTPATIENT)
Dept: MEDICAL GROUP | Facility: MEDICAL CENTER | Age: 61
End: 2021-08-26
Payer: COMMERCIAL

## 2021-08-26 VITALS
DIASTOLIC BLOOD PRESSURE: 70 MMHG | WEIGHT: 148.8 LBS | BODY MASS INDEX: 29.21 KG/M2 | HEIGHT: 60 IN | OXYGEN SATURATION: 95 % | SYSTOLIC BLOOD PRESSURE: 114 MMHG | TEMPERATURE: 97.4 F | HEART RATE: 72 BPM

## 2021-08-26 DIAGNOSIS — R10.11 RUQ PAIN: ICD-10-CM

## 2021-08-26 DIAGNOSIS — Z86.19 HISTORY OF HEPATITIS C: ICD-10-CM

## 2021-08-26 PROCEDURE — 99214 OFFICE O/P EST MOD 30 MIN: CPT | Performed by: NURSE PRACTITIONER

## 2021-08-26 ASSESSMENT — PAIN SCALES - GENERAL: PAINLEVEL: 3=SLIGHT PAIN

## 2021-08-26 ASSESSMENT — FIBROSIS 4 INDEX: FIB4 SCORE: 1.18

## 2021-08-26 NOTE — PROGRESS NOTES
"Chief Complaint   Patient presents with   • Abdominal Pain     Right side pain, off/on 1 week   • Nausea     2 days       Subjective:     HPI:     Lyn Morris is a 61 y.o. female here to discuss the evaluation and management of:    Patient of Jose J MARTINEZ    Patient presents today as she is having right upper quadrant pain intermittently for the last week.  Has some mild nausea.  No emesis.  Denies any blood or black tarry stools, abnormal weight loss.  She does endorse night sweats however she states that this has been ongoing for quite some time.  States that when she is sitting it does cause her some discomfort.  States that after she will eat she will have a \"burning \"sensation in her right upper quadrant.  She does a history of having a remote history of hepatitis C with treatment.  Last ultrasound of her liver in 2018 did not reveal any cholelithiasis.    States that she is having normal bowel movements and had 2 bowel movements this morning.  Denies any fevers, dysuria, new medications or trauma or injury to that area.  Denies any daily NSAID use.  No EtOH.      ROS:  Denies any Headache, Blurred Vision, Confusion, Chest pain,  Shortness of breath,  Abdominal pain, Changes of bowel or bladder, Lower ext edema, Fevers, Nights sweats, Weight Changes, Focal weakness or numbness.  And all other systems reviewed and are all negative. POSITIVE FOR : see above        Current Outpatient Medications:   •  cyclobenzaprine (FLEXERIL) 10 mg Tab, Take 1 tablet by mouth 2 times a day as needed for Moderate Pain or Muscle Spasms., Disp: 30 tablet, Rfl: 0  •  albuterol 108 (90 Base) MCG/ACT Aero Soln inhalation aerosol, Inhale 2 Puffs every 6 hours as needed for Shortness of Breath., Disp: 8.5 g, Rfl: 3  •  levothyroxine (SYNTHROID) 88 MCG Tab, Take 1 tablet by mouth once daily, Disp: 90 Tab, Rfl: 3    No Known Allergies    Past Medical History:   Diagnosis Date   • Essential hypertension 9/4/2014   • Family history of " diabetes mellitus    • Hepatitis C 2003    fibrosis, treated fall 2003, Dr. 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   • Vaginal pessary present 6/17/2021     Past Surgical History:   Procedure Laterality Date   • VAGINAL HYSTERECTOMY TOTAL  12/11/2008    Performed by KATY CONN at [facility]   • ANTERIOR AND POSTERIOR REPAIR  12/11/2008    Performed by KATY CONN at [facility]   • COLONOSCOPY  2003    normal     Family History   Problem Relation Age of Onset   • Cancer Sister         breast   • Hypertension Father    • Diabetes Father    • Hypertension Brother      Social History     Socioeconomic History   • Marital status:      Spouse name: Not on file   • Number of children: Not on file   • Years of education: Not on file   • Highest education level: Not on file   Occupational History   • Occupation: [employer], CNA     Employer: unknown   Tobacco Use   • Smoking status: Never Smoker   • Smokeless tobacco: Never Used   Vaping Use   • Vaping Use: Never used   Substance and Sexual Activity   • Alcohol use: No     Alcohol/week: 0.0 oz   • Drug use: No   • Sexual activity: Not Currently     Partners: Male   Other Topics Concern   • Not on file   Social History Narrative   • Not on file     Social Determinants of Health     Financial Resource Strain:    • Difficulty of Paying Living Expenses:    Food Insecurity:    • Worried About Running Out of Food in the Last Year:    • Ran Out of Food in the Last Year:    Transportation Needs:    • Lack of Transportation (Medical):    • Lack of Transportation (Non-Medical):    Physical Activity:    • Days of Exercise per Week:    • Minutes of Exercise per Session:    Stress:    • Feeling of Stress :    Social Connections:    • Frequency of Communication with Friends and Family:    • Frequency of Social Gatherings with Friends and Family:    • Attends Voodoo Services:     • Active Member of Clubs or Organizations:    • Attends Club or Organization Meetings:    • Marital Status:    Intimate Partner Violence:    • Fear of Current or Ex-Partner:    • Emotionally Abused:    • Physically Abused:    • Sexually Abused:        Objective:     Vitals: /70 (BP Location: Left arm, Patient Position: Sitting, BP Cuff Size: Adult)   Pulse 72   Temp 36.3 °C (97.4 °F) (Temporal)   Ht 1.524 m (5')   Wt 67.5 kg (148 lb 12.8 oz)   LMP 12/04/2008   SpO2 95%   BMI 29.06 kg/m²    General: Alert, pleasant, NAD  HEENT: Normocephalic.  Neck supple.   Respiratory: no distress, no audible wheezing, RR -WNL  Abdomen: Tenderness to right upper quadrant around hepatic flexure, slight firmness noted however no distinct mass appreciated.    Skin: Warm, dry, no rashes.  Extremities: No leg edema. No discoloration  Neurological: No tremors  Psych:  Affect/mood is normal, judgement is good, memory is intact, grooming is appropriate.    Assessment/Plan:     Lyn was seen today for abdominal pain and nausea.    Diagnoses and all orders for this visit:    RUQ pain  History of hepatitis C  New problem.  Unclear diagnosis at this time.  Nontoxic.  No acute distress.  Recommend ultrasound rule out any cholelithiasis.  Recommend hydration and bland diet.  Have discussed possible differentials with the patient.  Emergent precautions discussed.  Will call to follow-up next week on her symptoms.      -     US-RUQ; Future        No follow-ups on file.        Gege WEBBER

## 2021-08-31 ENCOUNTER — TELEPHONE (OUTPATIENT)
Dept: MEDICAL GROUP | Facility: MEDICAL CENTER | Age: 61
End: 2021-08-31

## 2021-08-31 NOTE — TELEPHONE ENCOUNTER
Phone Number Called: [phone]    Call outcome: Did not leave a detailed message. Requested patient to call back.    Message: Called to follow up with patient per provider's request. LVM requesting call back.

## 2021-09-03 ENCOUNTER — TELEPHONE (OUTPATIENT)
Dept: MEDICAL GROUP | Facility: MEDICAL CENTER | Age: 61
End: 2021-09-03

## 2021-09-03 NOTE — TELEPHONE ENCOUNTER
"Phone Number Called: [phone]    Call outcome: Spoke to patient regarding message below.    Message: Called to follow up with patient per provider's request. Patient stated that after Gege pressed on her stomach it \"kind of went away.\" The only time she has pain right now is after she eats. Patient stated that it still does burn. Patient stated she has no nausea, no changes in bowel movements, or any weight loss. Patient stated overall she is feeling better.             ----- Message from AKBAR Jackson sent at 8/26/2021  1:52 PM PDT -----  Can you call next week to f/u on her abdominal pain. Thanks.     "

## 2021-09-09 ENCOUNTER — PHYSICAL THERAPY (OUTPATIENT)
Dept: PHYSICAL THERAPY | Facility: REHABILITATION | Age: 61
End: 2021-09-09
Attending: STUDENT IN AN ORGANIZED HEALTH CARE EDUCATION/TRAINING PROGRAM
Payer: COMMERCIAL

## 2021-09-09 DIAGNOSIS — M54.50 ACUTE BILATERAL LOW BACK PAIN WITHOUT SCIATICA: ICD-10-CM

## 2021-09-09 PROCEDURE — 97161 PT EVAL LOW COMPLEX 20 MIN: CPT

## 2021-09-09 PROCEDURE — 97014 ELECTRIC STIMULATION THERAPY: CPT

## 2021-09-09 ASSESSMENT — ENCOUNTER SYMPTOMS
ALLEVIATING FACTORS: STRETCHING
PAIN SCALE: 1
PAIN SCALE AT LOWEST: 1
ALLEVIATING FACTORS: HEAT APPLICATION
ALLEVIATING FACTORS: POSITION CHANGE
PAIN SCALE AT HIGHEST: 6

## 2021-09-09 NOTE — OP THERAPY EVALUATION
Outpatient Physical Therapy  INITIAL EVALUATION    [facility] Physical Therapy [address].  [address]  [place] [address]  Phone:  [phone]  Fax:  [phone]    Date of Evaluation: 2021    Patient: Lyn Morris  YOB: 1960  MRN: [id number]     Referring Provider: Gabrielle Morgan P.A.-C.  [address] [facility] [address]  [place] [address]   Referring Diagnosis Acute bilateral low back pain without sciatica [M54.5]     Time Calculation                 Chief Complaint: No chief complaint on file.    Visit Diagnoses     ICD-10-CM   1. Acute bilateral low back pain without sciatica  M54.5       Date of onset of impairment: No data found    Subjective:   History of Present Illness:     Mechanism of injury:    Patient presents with lower back pain of insidious onset that has been present for several years. States that before it would fluctuate and come and go but for the last several weeks it has been constant. It is worse on the right side; endorses R glute pain and water rushing sensation in the posterior thighs first thing in the morning. She has tried ibuprofen and muscle relaxant with some relief. Has been going to the chiropractor for over a year, 1x/month (previously 1x/week)- does not feel it has been helping.   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 [employer] 3x/week.   Sleep disturbance:  Interrupted sleep (having to wake to turn)  Pain:     Current pain ratin    At best pain ratin    At worst pain ratin    Location:  Across the belt line    Relieving factors:  Position change, stretching and heat application    Exacerbated by: rolling in bed, going down stairs, STS transition after prolonged sitting.     Progression:  Stable  Diagnostic Tests:     X-ray: abnormal (2021)      Diagnostic Tests Comments:    IMPRESSION:     1.  There is very minimal degenerative endplate spurring in the lumbar  spine.  2.  Disc spaces are preserved and there is only minimal facet disease at the L5-S1 level.  3.  There is a question of congenitally short pedicles with apparent AP diameter narrowing in the L4-5 and L5-S1 levels.  Treatments:     None    Patient Goals:     Patient goals for therapy:  Decreased pain, increased motion, increased strength, return to sport/leisure activities and independence with ADLs/IADLs      Past Medical History:   Diagnosis Date   • Essential hypertension 9/4/2014   • Family history of diabetes mellitus    • Hepatitis C 2003    fibrosis, treated fall 2003, Dr. 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   • Vaginal pessary present 6/17/2021     Past Surgical History:   Procedure Laterality Date   • VAGINAL HYSTERECTOMY TOTAL  12/11/2008    Performed by KATY CONN at [facility]   • ANTERIOR AND POSTERIOR REPAIR  12/11/2008    Performed by KATY CONN at [facility]   • COLONOSCOPY  2003    normal     Social History     Tobacco Use   • Smoking status: Never Smoker   • Smokeless tobacco: Never Used   Substance Use Topics   • Alcohol use: No     Alcohol/week: 0.0 oz     Family and Occupational History     Socioeconomic History   • Marital status:      Spouse name: Not on file   • Number of children: Not on file   • Years of education: Not on file   • Highest education level: Not on file   Occupational History   • Occupation: [employer], CNA     Employer: unknown       Objective     Observations     Additional Observation Details  Had pt push a manual chair: observed extended arm position creating a longer lever for turns.    Postural Observations  Seated posture: good  Standing posture: good        Hip Screen   Hip range of motion within functional limits.  Hip strength within functional limits    Neurological Testing     Reflexes   Left   Patellar (L4): normal (2+)    Right   Patellar  (L4): normal (2+)    Myotome testing   Lumbar (left)   All left lumbar myotomes within normal limits    Lumbar (right)   All right lumbar myotomes within normal limits    Dermatome testing   Lumbar (left)   All left lumbar dermatomes intact    Lumbar (right)   All right lumbar dermatomes intact    Palpation   Left   Tenderness of the lumbar paraspinals.     Right   Tenderness of the gluteus medius, lumbar paraspinals, piriformis and quadratus lumborum.     Tenderness     Left Hip   Tenderness in the iliolumbar ligament.  No tenderness in the sacroiliac joint.     Right Hip   Tenderness in the iliolumbar ligament. No tenderness in the sacroiliac joint.     Active Range of Motion     Lumbar   Flexion: decreased (FT to mid shin with pain)  Extension: decreased (pinches in R lower lumbar, hinges from TLJ)  Left lateral flexion: within functional limits  Right lateral flexion: within functional limits  Left rotation: decreased  Right rotation: within functional limits    Additional Active Range of Motion Details  B knees with crepitus    Joint Play   Spine     Central PA [place]        T12: WFL       L1: WFL       L2: WFL       L3: WFL       L4: hypomobile       L5: hypomobile    Unilateral PA Glide (right)        L3: WFL       L4: painful       L5: painful       S1: painful        Strength:      Abdominals   Lower abdominals: Able to initiate but not maintain neutral    Lower extremities   Normal left lower extremity strength  Normal right lower extremity strength    Additional Strength Details    Squat limited to 1/4 depth due to bilateral knee pain, but shows good hip hinge    Tests       Lumbar spine (left)      Negative slump.   Lumbar spine (right)     Negative slump.     Left Pelvic Girdle/Sacrum   Negative: sacral thrust.     Right Pelvic Girdle/Sacrum   Negative: sacral thrust.     Left Hip   SLR: Negative.     Right Hip   SLR: Negative.     General Comments     Spine Comments   Reduce pain after  PPU        Therapeutic Treatments and Modalities:     1. E Stim Unattended (CPT 74205), IFC and MH to the lumbar in prone x 15 min    Therapeutic Treatment and Modalities Summary: Access Code: [email or web address]  URL: [email or web address]  Date: 09/09/2021  Prepared by: Ellie Robin    Exercises  Prone Press Up - 2 x daily - 20 reps  Standing Lumbar Extension at Wall - Forearms - 4 x daily - 10 reps  Supine Lower Trunk Rotation - 2 x daily - 2 sets - 10 reps  Supine Bridge - 2 x daily - 2 sets - 10 reps      Time-based treatments/modalities:           Assessment, Response and Plan:   Impairments: abnormal or restricted ROM, activity intolerance, difficulty performing job, impaired functional mobility, impaired physical strength, lacks appropriate home exercise program, limited ADL's and pain with function    Assessment details:    Ms. Morris is a 61 y.o female who presents to PT with complaint of chronic LBP with insidious onset.  PT evaluation is most consistent with lumbar disc derangement.  Likely contributors include frequent pushing/turning heavy loads working in patient transport.  Pt demonstrated positive response to extension bias today, making her a good candidate for PT. Neuro exam was unremarkable. Skilled PT services are indicated to address the mentioned functional limitations and enhance QOL.     Prognosis: good    Goals:   Short Term Goals:   - Pt reports her pain to be intermittent vs constant  - Pt shows improved posterior chain endurance by being able to hold a bridge position x 2 min.   - Pt is able to STS pain free with good mechanics.   Short term goal time span:  1-2 weeks      Long Term Goals:    - Improve RMQ to less than 4/24  - Pt demonstrates indep with body mechanics for pushing wheelchairs and for pt transfers  - Pt is able to work full time with no more than 3/10 LBP  - Indep with HEP  Long term goal time span:  6-8 weeks    Plan:   Therapy options:  Physical therapy treatment  to continue  Planned therapy interventions:  E Stim Unattended (CPT 07767), Functional Training, Self Care (CPT 19314), Therapeutic Exercise (CPT 74239), Therapeutic Activities (CPT 11184), Hot or Cold Pack Therapy (CPT 02436), Mechanical Traction (CPT 47291) and Neuromuscular Re-education (CPT 24995)  Frequency: 1-2x/week.  Duration in weeks:  8  Discussed with:  Patient  Plan details:  Pt is only available on Thursdays and Fridays.       Functional Assessment Used    RMQ= 14/24    Referring provider co-signature:  I have reviewed this plan of care and my co-signature certifies the need for services.    Certification Period: 09/09/2021 to  11/04/21    Physician Signature: ________________________________ Date: ______________

## 2021-09-19 ENCOUNTER — HOSPITAL ENCOUNTER (OUTPATIENT)
Dept: RADIOLOGY | Facility: MEDICAL CENTER | Age: 61
End: 2021-09-19
Attending: NURSE PRACTITIONER
Payer: COMMERCIAL

## 2021-09-19 DIAGNOSIS — R10.11 RUQ PAIN: ICD-10-CM

## 2021-09-19 DIAGNOSIS — Z86.19 HISTORY OF HEPATITIS C: ICD-10-CM

## 2021-09-19 PROCEDURE — 76705 ECHO EXAM OF ABDOMEN: CPT

## 2021-09-23 ENCOUNTER — PHYSICAL THERAPY (OUTPATIENT)
Dept: PHYSICAL THERAPY | Facility: REHABILITATION | Age: 61
End: 2021-09-23
Attending: STUDENT IN AN ORGANIZED HEALTH CARE EDUCATION/TRAINING PROGRAM
Payer: COMMERCIAL

## 2021-09-23 DIAGNOSIS — M54.50 ACUTE BILATERAL LOW BACK PAIN WITHOUT SCIATICA: ICD-10-CM

## 2021-09-23 PROCEDURE — 97110 THERAPEUTIC EXERCISES: CPT

## 2021-09-23 NOTE — OP THERAPY DAILY TREATMENT
"  Outpatient Physical Therapy  DAILY TREATMENT     [facility] Physical [address].  [address]  [place] [address]  Phone:  [phone]  Fax:  [phone]    Date: 09/23/2021    Patient: Lyn Morris  YOB: 1960  MRN: [id number]     Time Calculation    Start time: 0900  Stop time: 0940 Time Calculation (min): 40 minutes         Chief Complaint: Back Problem    Visit #: 2    SUBJECTIVE:  My LBP is much better. The HEP is helping and the pain is now infrequent.     OBJECTIVE:      Therapeutic Exercises (CPT 38048):     1. NuStep, L2 x 5 min     2. PPU, x 10    3. Sumit pose, 3 way x 30\" ea    4. Cat/cow, x 20    5. LTR, x 10 ea    6. Supine active HS str, x 10 ea    7. Standard bridge, x 10    8. Marching bridge, x 15 ea    9. Supine 90/90 hold, 30\" hold    10. Supine 90/90 taps, 2x10 ea    11. Pullbacks, L2 x 20    Therapeutic Treatments and Modalities:     1. E Stim Unattended (CPT 34354), IFC and MH to the lumbar in prone x 15 min    Therapeutic Treatment and Modalities Summary:     Access Code: [email or web address]  URL: [email or web address]  Date: 09/23/2021  Prepared by: Ellie Robin    Exercises  Marching Bridge - 1 x daily - 2 sets - 10 reps  Supine 90/90 Alternating Toe Touch - 1 x daily - 2 sets - 10 reps  Side Plank on Knees - 1 x daily - 2 sets - 10 reps - 5 sec hold  Sit to Stand without Arm Support - 1 x daily - 2 sets - 10 reps  Shoulder Extension with Resistance - 1 x daily - 2 sets - 10 reps      Time-based treatments/modalities:    Physical Therapy Timed Treatment Charges  Therapeutic exercise minutes (CPT 64294): 40 minutes    ASSESSMENT:   Response to treatment: Excellent response to extension bias and able to tolerate the progression to moderate level core stability tasks without increased pain.  To continue mobility HEP daily (multiple a day if needed) and strength HEP 4-5x/week.     PLAN/RECOMMENDATIONS:   Plan for treatment: therapy treatment to " continue next visit.  Planned interventions for next visit: continue with current treatment.  Load lifting patterns.

## 2021-09-30 ENCOUNTER — PHYSICAL THERAPY (OUTPATIENT)
Dept: PHYSICAL THERAPY | Facility: REHABILITATION | Age: 61
End: 2021-09-30
Attending: STUDENT IN AN ORGANIZED HEALTH CARE EDUCATION/TRAINING PROGRAM
Payer: COMMERCIAL

## 2021-09-30 DIAGNOSIS — M54.50 ACUTE BILATERAL LOW BACK PAIN WITHOUT SCIATICA: ICD-10-CM

## 2021-09-30 PROCEDURE — 97110 THERAPEUTIC EXERCISES: CPT

## 2021-09-30 NOTE — OP THERAPY DAILY TREATMENT
"  Outpatient Physical Therapy  DAILY TREATMENT     [facility] Physical [address].  [address]  [place] [address]  Phone:  [phone]  Fax:  [phone]    Date: 09/30/2021    Patient: Lyn Morris  YOB: 1960  MRN: [id number]     Time Calculation    Start time: 0900  Stop time: 0939 Time Calculation (min): 39 minutes         Chief Complaint: Back Problem    Visit #: 3    SUBJECTIVE:  I'm doing better.  Woke up with my back a little tight, but the HEP seems to be helping.     OBJECTIVE:      Therapeutic Exercises (CPT 30870):     1. LTR with fig 4, x 10 ea    2. KTOS, x 1 min ea    3. Supine 90/90 taps, 2x10    4. Marching bridge, x 15 ea    5. Cat/cow, x 10 ea    6. PPU , x 10 ea    7. Prone swimmer, x 15 ea    8. SL clam/reverse clam, x 15 ea    9. SL hip abd, x 10 ea    10. 1/2 squats, x 20    11. Step ups, 6\" x 15 ea, controlled, cues for R LE alignment resolves reported knee pain    Therapeutic Treatments and Modalities:     1. E Stim Unattended (CPT 85442), not today    Therapeutic Treatment and Modalities Summary:     Access Code: [email or web address]  URL: [email or web address]  Date: 09/23/2021  Prepared by: Ellie Robin    Exercises  Marching Bridge - 1 x daily - 2 sets - 10 reps  Supine 90/90 Alternating Toe Touch - 1 x daily - 2 sets - 10 reps  Side Plank on Knees - 1 x daily - 2 sets - 10 reps - 5 sec hold  Sit to Stand without Arm Support - 1 x daily - 2 sets - 10 reps  Shoulder Extension with Resistance - 1 x daily - 2 sets - 10 reps      Time-based treatments/modalities:    Physical Therapy Timed Treatment Charges  Therapeutic exercise minutes (CPT 67338): 39 minutes    ASSESSMENT:   Response to treatment: Improved pain control through HEP. Weakness in hip rotators R>L resulting in decreased lumbopelvic control and LE alignment in loading positions (squatting, lifting, stair stepping).  Receptive to cuing, but needs to further develop strength to enhance " longterm success.     PLAN/RECOMMENDATIONS:   Plan for treatment: therapy treatment to continue next visit.  Planned interventions for next visit: continue with current treatment.

## 2021-10-11 ENCOUNTER — PHYSICAL THERAPY (OUTPATIENT)
Dept: PHYSICAL THERAPY | Facility: REHABILITATION | Age: 61
End: 2021-10-11
Attending: STUDENT IN AN ORGANIZED HEALTH CARE EDUCATION/TRAINING PROGRAM
Payer: COMMERCIAL

## 2021-10-11 DIAGNOSIS — M54.50 ACUTE BILATERAL LOW BACK PAIN WITHOUT SCIATICA: ICD-10-CM

## 2021-10-11 PROCEDURE — 97110 THERAPEUTIC EXERCISES: CPT

## 2021-10-11 NOTE — OP THERAPY DAILY TREATMENT
"  Outpatient Physical Therapy  DAILY TREATMENT     [facility] Physical [address].  [address]  [place] [address]  Phone:  [phone]  Fax:  [phone]    Date: 10/11/2021    Patient: Lyn Morris  YOB: 1960  MRN: [id number]     Time Calculation    Start time: 1030  Stop time: 1115 Time Calculation (min): 45 minutes         Chief Complaint: No chief complaint on file.    Visit #: 4    SUBJECTIVE:  Pt states that she is doing really well. She hasn't been having much pain. Getting up out of bed is fine now. She states that if she has sx's at work she is able to work through them w/ ext stretching in standing.    OBJECTIVE:            Therapeutic Exercises (CPT 86432):     1. LTR with fig 4, x 10 ea    2. KTOS, x 1 min ea    3. Supine 90/90 taps, 2x10    4. Marching bridge, 4x10    5. Cat/cow, x 10 ea    6. PPU , x 10 ea    7. Prone swimmer, 10x10\"    8. Clam, OTB x10    9. SL hip abd, x 10 ea    10. 1/2 squats, x 20    11. Step ups, 6\" x 15 ea, controlled, cues for R LE alignment resolves reported knee pain    12. Side plank from kneex, 2x10x5\" hold      Therapeutic Exercise Summary: Access Code: [email or web address]  URL: [email or web address]  Date: 10/11/2021  Prepared by: Felicia Maravilla    Exercises  Prone Press Up - 2 x daily - 7 x weekly - 2 sets - 10 reps  Marching Bridge - 1 x daily - 7 x weekly - 2 sets - 10 reps  Supine 90/90 Alternating Toe Touch - 1 x daily - 7 x weekly - 2 sets - 10 reps  Side Plank on Knees - 1 x daily - 7 x weekly - 10 reps - 10 seconds hold  Prone Alternating Arm and Leg Lifts - 1 x daily - 7 x weekly - 10 reps - 10 seconds hold  Sit to Stand with Arms Crossed - 1 x daily - 7 x weekly - 2 sets - 10 reps  Clamshell with Resistance - 1 x daily - 7 x weekly - 2 sets - 10 reps  Supine Lower Trunk Rotation - 1 x daily - 7 x weekly - 10 reps  Cat-Camel - 1 x daily - 7 x weekly - 10 reps      Therapeutic Treatments and Modalities:     1. E Stim " Unattended (CPT 69986), not today    Therapeutic Treatment and Modalities Summary:     Access Code: [email or web address]  URL: [email or web address]  Date: 09/23/2021  Prepared by: Ellie Robin    Exercises  Marching Bridge - 1 x daily - 2 sets - 10 reps  Supine 90/90 Alternating Toe Touch - 1 x daily - 2 sets - 10 reps  Side Plank on Knees - 1 x daily - 2 sets - 10 reps - 5 sec hold  Sit to Stand without Arm Support - 1 x daily - 2 sets - 10 reps  Shoulder Extension with Resistance - 1 x daily - 2 sets - 10 reps      Time-based treatments/modalities:    Physical Therapy Timed Treatment Charges  Therapeutic exercise minutes (CPT 87538): 45 minutes    ASSESSMENT:   Response to treatment: Pt demo's overall improved functional strength. She demo's good sx control w/ HEP and is able to perform sit to stand w/o UE w/o limitations. Pt is progressing well at this time and will benefit from check in two to three weeks from now for plan for d/c and review of HEP. Pt was educated in self progressions w/ HEP.     PLAN/RECOMMENDATIONS:   Check in 2-3 wks and plan for d/c. Educate in self progression for extensor strengthening

## 2021-11-11 ENCOUNTER — TELEPHONE (OUTPATIENT)
Dept: SCHEDULING | Facility: IMAGING CENTER | Age: 61
End: 2021-11-11

## 2021-11-24 ENCOUNTER — OFFICE VISIT (OUTPATIENT)
Dept: MEDICAL GROUP | Facility: MEDICAL CENTER | Age: 61
End: 2021-11-24
Payer: COMMERCIAL

## 2021-11-24 ENCOUNTER — HOSPITAL ENCOUNTER (OUTPATIENT)
Dept: LAB | Facility: MEDICAL CENTER | Age: 61
End: 2021-11-24
Attending: STUDENT IN AN ORGANIZED HEALTH CARE EDUCATION/TRAINING PROGRAM
Payer: COMMERCIAL

## 2021-11-24 VITALS
DIASTOLIC BLOOD PRESSURE: 74 MMHG | RESPIRATION RATE: 16 BRPM | WEIGHT: 151 LBS | HEART RATE: 74 BPM | OXYGEN SATURATION: 98 % | HEIGHT: 65 IN | TEMPERATURE: 97.7 F | SYSTOLIC BLOOD PRESSURE: 118 MMHG | BODY MASS INDEX: 25.16 KG/M2

## 2021-11-24 DIAGNOSIS — M54.50 ACUTE BILATERAL LOW BACK PAIN WITHOUT SCIATICA: ICD-10-CM

## 2021-11-24 DIAGNOSIS — J45.20 MILD INTERMITTENT ASTHMA, UNCOMPLICATED: ICD-10-CM

## 2021-11-24 DIAGNOSIS — E03.4 HYPOTHYROIDISM DUE TO ACQUIRED ATROPHY OF THYROID: ICD-10-CM

## 2021-11-24 LAB
T4 FREE SERPL-MCNC: 1.53 NG/DL (ref 0.93–1.7)
TSH SERPL DL<=0.005 MIU/L-ACNC: 1.68 UIU/ML (ref 0.38–5.33)

## 2021-11-24 PROCEDURE — 84443 ASSAY THYROID STIM HORMONE: CPT

## 2021-11-24 PROCEDURE — 84439 ASSAY OF FREE THYROXINE: CPT

## 2021-11-24 PROCEDURE — 36415 COLL VENOUS BLD VENIPUNCTURE: CPT

## 2021-11-24 PROCEDURE — 99213 OFFICE O/P EST LOW 20 MIN: CPT | Performed by: STUDENT IN AN ORGANIZED HEALTH CARE EDUCATION/TRAINING PROGRAM

## 2021-11-24 ASSESSMENT — FIBROSIS 4 INDEX: FIB4 SCORE: 1.18

## 2021-11-24 NOTE — PROGRESS NOTES
"Subjective:     CC: Establish care, back pain, asthma, glucose    HPI:   Lyn presents today to establish care.  Previous patient of Jose J MARTINEZ.  Patient feels overall well today and has no new concerns.  Patient is up-to-date on health maintenance.    Back pain  At last visit with me patient was referred to physical therapy.  Patient has recently completed physical therapy and states she no longer has back pain.  Patient continues to do exercises at home.  Patient does continue to have knee pain.  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    Asthma  Patient continues to use albuterol only as needed.  Patient states that her use is rare and only during allergy season.    Elevated glucose  Patient with previous elevated glucose.  Patient's last A1c within normal limits.  Patient continues work on diet and exercise.    Hypothyroid  Patient's last thyroid labs within normal limits.  Patient continues on levothyroxine 88 mcg.  Patient does note that she is often hot compared to her .  Patient denies palpitations.  Patient denies weight loss, skin changes, hair changes.      ROS:  Gen: no fevers/chills  ENT: no sore throat  Pulm: no sob, no cough  CV: no chest pain, no palpitations  GI: no nausea/vomiting, no diarrhea  Neuro: no headaches      Objective:     Exam:  /74 (BP Location: Right arm, Patient Position: Sitting, BP Cuff Size: Adult)   Pulse 74   Temp 36.5 °C (97.7 °F) (Temporal)   Resp 16   Ht 1.651 m (5' 5\")   Wt 68.5 kg (151 lb)   LMP 12/04/2008   SpO2 98%   BMI 25.13 kg/m²  Body mass index is 25.13 kg/m².    Gen: Alert and oriented, No apparent distress.  Neck: Neck is supple without lymphadenopathy.  Lungs: Normal effort, CTA bilaterally, no wheezes, rhonchi, or rales  CV: Regular rate and rhythm. No murmurs, rubs, or gallops.  Ext: No clubbing, cyanosis, edema.      Assessment & Plan:     61 y.o. female with the following -     1. " Hypothyroidism due to acquired atrophy of thyroid  Patient notes symptoms of hot flashes.  Thyroid previously well controlled on levothyroxine 88 mcg.  We will recheck and make sure this is not the cause of her symptoms.  Patient has no other symptoms of hyper/hypothyroid.  - TSH; Future  - FREE THYROXINE; Future    2. Mild intermittent asthma, uncomplicated  Chronic, stable.  Patient continues on albuterol as needed.    3. Acute bilateral low back pain without sciatica  Acute, improved.  Patient has completed physical therapy and continues to do stretches at home.    Return in about 6 months (around 5/24/2022).    Please note that this dictation was created using voice recognition software. I have made every reasonable attempt to correct obvious errors, but I expect that there are errors of grammar and possibly content that I did not discover before finalizing the note.

## 2021-12-23 ENCOUNTER — TELEPHONE (OUTPATIENT)
Dept: PHYSICAL THERAPY | Facility: REHABILITATION | Age: 61
End: 2021-12-23

## 2021-12-23 NOTE — OP THERAPY DISCHARGE SUMMARY
Outpatient Physical Therapy  DISCHARGE SUMMARY NOTE      [facility] Physical Therapy [address].  [address]  [place] [address]  Phone:  [phone]  Fax:  [phone]    Date of Visit: 12/23/2021    Patient: Lyn Morris  YOB: 1960  MRN: [id number]     Referring Provider: No referring provider defined for this encounter.   Referring Diagnosis No admission diagnoses are documented for this encounter.         Functional Assessment Used        Your patient is being discharged from Physical Therapy with the following comments:   · Patient has failed to schedule or reschedule follow-up visits    Comments:  Eval 9/9/21. Last seen 10/11/21. THAD Allen PT, DPT    Date: 12/23/2021

## 2022-05-26 ENCOUNTER — APPOINTMENT (OUTPATIENT)
Dept: MEDICAL GROUP | Facility: MEDICAL CENTER | Age: 62
End: 2022-05-26
Payer: COMMERCIAL

## 2022-05-26 ENCOUNTER — OFFICE VISIT (OUTPATIENT)
Dept: MEDICAL GROUP | Facility: MEDICAL CENTER | Age: 62
End: 2022-05-26

## 2022-05-26 VITALS
BODY MASS INDEX: 29.45 KG/M2 | RESPIRATION RATE: 16 BRPM | DIASTOLIC BLOOD PRESSURE: 78 MMHG | HEART RATE: 72 BPM | TEMPERATURE: 97.4 F | WEIGHT: 150 LBS | HEIGHT: 60 IN | SYSTOLIC BLOOD PRESSURE: 128 MMHG | OXYGEN SATURATION: 96 %

## 2022-05-26 DIAGNOSIS — E03.4 HYPOTHYROIDISM DUE TO ACQUIRED ATROPHY OF THYROID: ICD-10-CM

## 2022-05-26 DIAGNOSIS — Z23 NEED FOR VACCINATION: ICD-10-CM

## 2022-05-26 DIAGNOSIS — J45.20 MILD INTERMITTENT ASTHMA, UNCOMPLICATED: ICD-10-CM

## 2022-05-26 DIAGNOSIS — Z12.31 ENCOUNTER FOR SCREENING MAMMOGRAM FOR MALIGNANT NEOPLASM OF BREAST: ICD-10-CM

## 2022-05-26 DIAGNOSIS — E66.3 OVERWEIGHT (BMI 25.0-29.9): ICD-10-CM

## 2022-05-26 PROCEDURE — 99396 PREV VISIT EST AGE 40-64: CPT | Mod: 25 | Performed by: STUDENT IN AN ORGANIZED HEALTH CARE EDUCATION/TRAINING PROGRAM

## 2022-05-26 PROCEDURE — 90471 IMMUNIZATION ADMIN: CPT | Performed by: STUDENT IN AN ORGANIZED HEALTH CARE EDUCATION/TRAINING PROGRAM

## 2022-05-26 PROCEDURE — 90677 PCV20 VACCINE IM: CPT | Performed by: STUDENT IN AN ORGANIZED HEALTH CARE EDUCATION/TRAINING PROGRAM

## 2022-05-26 ASSESSMENT — FIBROSIS 4 INDEX: FIB4 SCORE: 1.18

## 2022-05-26 ASSESSMENT — PATIENT HEALTH QUESTIONNAIRE - PHQ9: CLINICAL INTERPRETATION OF PHQ2 SCORE: 0

## 2022-05-26 NOTE — PROGRESS NOTES
Subjective:     CC: Hypothyroid, annual follow-up    HPI:   Lyn presents today with    Thyroid  Patient has been stable on levothyroxine 88 mcg.  Continues to note feeling hot at night compared to her  but denies any other symptoms.    Asthma  Patient uses albuterol as needed during allergy season.    Patient continues to work on diet and exercise.  Patient has continued with exercises for lower back pain and bilateral knee pain learned at physical therapy.  Patient states that she is taking the stairs and stretching.  Patient gets plenty of exercise at work and gardens in the evening.  She continues to work on eating a well-balanced diet.      ROS:  Gen: no fevers/chills  Pulm: no sob, no cough  CV: no chest pain, no palpitations  GI: no nausea/vomiting, no diarrhea  Neuro: no headaches      Objective:     Exam:  /78 (BP Location: Right arm, Patient Position: Sitting, BP Cuff Size: Adult)   Pulse 72   Temp 36.3 °C (97.4 °F) (Temporal)   Resp 16   Ht 1.524 m (5')   Wt 68 kg (150 lb)   LMP 12/04/2008   SpO2 96%   Breastfeeding No   BMI 29.29 kg/m²  Body mass index is 29.29 kg/m².    Gen: Alert and oriented, No apparent distress.  Neck: Neck is supple without lymphadenopathy.  Lungs: Normal effort, CTA bilaterally, no wheezes, rhonchi, or rales  CV: Regular rate and rhythm. No murmurs, rubs, or gallops.  Ext: No clubbing, cyanosis, edema.      Assessment & Plan:     61 y.o. female with the following -     1. Hypothyroidism due to acquired atrophy of thyroid  Chronic, stable.  Patient continues on levothyroxine 88 mcg.  Patient has continued to be well controlled.  - Lipid Profile; Future  - Comp Metabolic Panel; Future  - TSH; Future  - FREE THYROXINE; Future    2. Need for vaccination  - Pneumococcal Conjugate Vaccine 20-Valent (19 yrs+)    3. Encounter for screening mammogram for malignant neoplasm of breast  - MA-SCREENING MAMMO BILAT W/TOMOSYNTHESIS W/CAD; Future    4. Mild intermittent  asthma, uncomplicated  Chronic, stable.  Patient using albuterol only as needed.    5. Overweight (BMI 25.0-29.9)  Chronic, stable.  Patient continues to work on diet and exercise.      Return in about 6 months (around 11/26/2022).    Please note that this dictation was created using voice recognition software. I have made every reasonable attempt to correct obvious errors, but I expect that there are errors of grammar and possibly content that I did not discover before finalizing the note.

## 2022-07-14 ENCOUNTER — HOSPITAL ENCOUNTER (OUTPATIENT)
Dept: LAB | Facility: MEDICAL CENTER | Age: 62
End: 2022-07-14
Attending: STUDENT IN AN ORGANIZED HEALTH CARE EDUCATION/TRAINING PROGRAM
Payer: COMMERCIAL

## 2022-07-14 DIAGNOSIS — E03.4 HYPOTHYROIDISM DUE TO ACQUIRED ATROPHY OF THYROID: ICD-10-CM

## 2022-07-14 LAB
ALBUMIN SERPL BCP-MCNC: 4.1 G/DL (ref 3.2–4.9)
ALBUMIN/GLOB SERPL: 1.3 G/DL
ALP SERPL-CCNC: 108 U/L (ref 30–99)
ALT SERPL-CCNC: 16 U/L (ref 2–50)
ANION GAP SERPL CALC-SCNC: 11 MMOL/L (ref 7–16)
AST SERPL-CCNC: 21 U/L (ref 12–45)
BILIRUB SERPL-MCNC: 0.5 MG/DL (ref 0.1–1.5)
BUN SERPL-MCNC: 9 MG/DL (ref 8–22)
CALCIUM SERPL-MCNC: 8.8 MG/DL (ref 8.5–10.5)
CHLORIDE SERPL-SCNC: 104 MMOL/L (ref 96–112)
CHOLEST SERPL-MCNC: 178 MG/DL (ref 100–199)
CO2 SERPL-SCNC: 23 MMOL/L (ref 20–33)
CREAT SERPL-MCNC: 0.58 MG/DL (ref 0.5–1.4)
FASTING STATUS PATIENT QL REPORTED: NORMAL
GFR SERPLBLD CREATININE-BSD FMLA CKD-EPI: 103 ML/MIN/1.73 M 2
GLOBULIN SER CALC-MCNC: 3.1 G/DL (ref 1.9–3.5)
GLUCOSE SERPL-MCNC: 83 MG/DL (ref 65–99)
HDLC SERPL-MCNC: 59 MG/DL
LDLC SERPL CALC-MCNC: 101 MG/DL
POTASSIUM SERPL-SCNC: 4.1 MMOL/L (ref 3.6–5.5)
PROT SERPL-MCNC: 7.2 G/DL (ref 6–8.2)
SODIUM SERPL-SCNC: 138 MMOL/L (ref 135–145)
T4 FREE SERPL-MCNC: 1.63 NG/DL (ref 0.93–1.7)
TRIGL SERPL-MCNC: 90 MG/DL (ref 0–149)
TSH SERPL DL<=0.005 MIU/L-ACNC: 0.9 UIU/ML (ref 0.38–5.33)

## 2022-07-14 PROCEDURE — 84439 ASSAY OF FREE THYROXINE: CPT

## 2022-07-14 PROCEDURE — 84443 ASSAY THYROID STIM HORMONE: CPT

## 2022-07-14 PROCEDURE — 80061 LIPID PANEL: CPT

## 2022-07-14 PROCEDURE — 80053 COMPREHEN METABOLIC PANEL: CPT

## 2022-07-14 PROCEDURE — 36415 COLL VENOUS BLD VENIPUNCTURE: CPT

## 2022-09-29 ENCOUNTER — OFFICE VISIT (OUTPATIENT)
Dept: MEDICAL GROUP | Facility: MEDICAL CENTER | Age: 62
End: 2022-09-29
Payer: COMMERCIAL

## 2022-09-29 VITALS
OXYGEN SATURATION: 98 % | HEART RATE: 68 BPM | DIASTOLIC BLOOD PRESSURE: 72 MMHG | BODY MASS INDEX: 28.66 KG/M2 | SYSTOLIC BLOOD PRESSURE: 122 MMHG | RESPIRATION RATE: 16 BRPM | HEIGHT: 60 IN | WEIGHT: 146 LBS | TEMPERATURE: 97.2 F

## 2022-09-29 DIAGNOSIS — M25.561 ACUTE PAIN OF RIGHT KNEE: ICD-10-CM

## 2022-09-29 DIAGNOSIS — M54.50 ACUTE BILATERAL LOW BACK PAIN WITHOUT SCIATICA: ICD-10-CM

## 2022-09-29 DIAGNOSIS — M25.562 CHRONIC PAIN OF BOTH KNEES: ICD-10-CM

## 2022-09-29 DIAGNOSIS — M25.561 CHRONIC PAIN OF BOTH KNEES: ICD-10-CM

## 2022-09-29 DIAGNOSIS — J45.20 MILD INTERMITTENT ASTHMA, UNCOMPLICATED: ICD-10-CM

## 2022-09-29 DIAGNOSIS — G89.29 CHRONIC PAIN OF BOTH KNEES: ICD-10-CM

## 2022-09-29 DIAGNOSIS — E66.3 OVERWEIGHT (BMI 25.0-29.9): ICD-10-CM

## 2022-09-29 PROCEDURE — 99214 OFFICE O/P EST MOD 30 MIN: CPT | Performed by: STUDENT IN AN ORGANIZED HEALTH CARE EDUCATION/TRAINING PROGRAM

## 2022-09-29 RX ORDER — ALBUTEROL SULFATE 90 UG/1
2 AEROSOL, METERED RESPIRATORY (INHALATION) EVERY 6 HOURS PRN
Qty: 8.5 G | Refills: 3 | Status: SHIPPED | OUTPATIENT
Start: 2022-09-29

## 2022-09-29 ASSESSMENT — FIBROSIS 4 INDEX: FIB4 SCORE: 1.43

## 2022-09-29 NOTE — PROGRESS NOTES
Subjective:     Chief Complaint   Patient presents with    Knee Pain     Right knee    Medication Refill     Albuterol          HPI:   Lyn presents today with     Right knee pain  Patient presents today for concern about right knee pain.  Patient notes that it started 5 days ago when she was sitting on the ground and crawled to get up.  Patient notes that the of her knee into her calf locked up and was severely painful.  Patient notes that this lasted a few minutes and then she was able to stand/walk.  Patient has tried wearing a brace but it slips down and becomes uncomfortable.  Patient is able to bear full weight on right leg.  Patient is able to bend and extend with no difficulties.  Patient is tender to touch over the posterior knee and calf.  Patient does have previous x-ray from last year of right knee showing degenerative changes of the medial compartment.    Bilateral hip pain  Patient also notes pain in bilateral hips.  This again appears to be a chronic problem with degenerative changes in bilateral hips and lower back.  Patient states that her hips feel bruised and painful when she lays on her sides.  Patient has done physical therapy but unclear if she is still doing her exercises at home.    Asthma  Patient using albuterol only as needed during allergy season.  Patient needs refill on albuterol inhaler.    Hypothyroid  Patient's thyroid well controlled.    ROS:  Gen: no fevers/chills  Pulm: no sob, no cough  CV: no chest pain, no palpitations  GI: no nausea/vomiting, no diarrhea      Objective:     Exam:  /72 (BP Location: Right arm, Patient Position: Sitting, BP Cuff Size: Small adult)   Pulse 68   Temp 36.2 °C (97.2 °F) (Temporal)   Resp 16   Ht 1.524 m (5')   Wt 66.2 kg (146 lb)   LMP 12/04/2008   SpO2 98%   BMI 28.51 kg/m²  Body mass index is 28.51 kg/m².    Gen: Alert and oriented, No apparent distress.  Neck: Neck is supple without lymphadenopathy.  Lungs: Normal effort, CTA  bilaterally, no wheezes, rhonchi, or rales  CV: Regular rate and rhythm. No murmurs, rubs, or gallops.  Ext: No clubbing, cyanosis, edema.      Assessment & Plan:     62 y.o. female with the following -     1. Mild intermittent asthma, uncomplicated  - albuterol 108 (90 Base) MCG/ACT Aero Soln inhalation aerosol; Inhale 2 Puffs every 6 hours as needed for Shortness of Breath.  Dispense: 8.5 g; Refill: 3    2. Acute pain of right knee  Acute on chronic, stable.  Patient notes that 5 days ago she injured right knee.  Patient advised to continue with symptomatic treatment including exercises from physical therapy, heat, stretching, rest.  Advised to follow-up if no improvement.    3. Chronic pain of both knees  Chronic degenerative changes of bilateral knee.  X-rays completed 1 year ago.    4. Overweight (BMI 25.0-29.9)  Chronic, stable.  BMI 28.    5. Acute bilateral low back pain without sciatica  Chronic, stable.  Discussed with patient degenerative changes in the lower back, hips, knees.  Patient encouraged to continue with regular movement and exercises from physical therapy.        No follow-ups on file.    Please note that this dictation was created using voice recognition software. I have made every reasonable attempt to correct obvious errors, but I expect that there are errors of grammar and possibly content that I did not discover before finalizing the note.

## 2022-12-22 ENCOUNTER — OFFICE VISIT (OUTPATIENT)
Dept: MEDICAL GROUP | Facility: MEDICAL CENTER | Age: 62
End: 2022-12-22
Payer: COMMERCIAL

## 2022-12-22 VITALS
OXYGEN SATURATION: 98 % | SYSTOLIC BLOOD PRESSURE: 160 MMHG | HEART RATE: 68 BPM | TEMPERATURE: 96.9 F | DIASTOLIC BLOOD PRESSURE: 84 MMHG

## 2022-12-22 DIAGNOSIS — J02.9 ACUTE PHARYNGITIS, UNSPECIFIED ETIOLOGY: ICD-10-CM

## 2022-12-22 LAB
INT CON NEG: NEGATIVE
INT CON POS: POSITIVE
S PYO AG THROAT QL: NORMAL

## 2022-12-22 PROCEDURE — 99213 OFFICE O/P EST LOW 20 MIN: CPT | Performed by: STUDENT IN AN ORGANIZED HEALTH CARE EDUCATION/TRAINING PROGRAM

## 2022-12-22 PROCEDURE — 87880 STREP A ASSAY W/OPTIC: CPT | Performed by: STUDENT IN AN ORGANIZED HEALTH CARE EDUCATION/TRAINING PROGRAM

## 2022-12-22 RX ORDER — FLUTICASONE PROPIONATE 50 MCG
1 SPRAY, SUSPENSION (ML) NASAL DAILY
Qty: 16 G | Refills: 0 | Status: SHIPPED | OUTPATIENT
Start: 2022-12-22

## 2022-12-22 RX ORDER — BENZONATATE 100 MG/1
100 CAPSULE ORAL 3 TIMES DAILY PRN
Qty: 60 CAPSULE | Refills: 0 | Status: SHIPPED | OUTPATIENT
Start: 2022-12-22 | End: 2023-05-04

## 2022-12-22 NOTE — PROGRESS NOTES
Chief Complaint   Patient presents with    Pharyngitis        HPI: This is a 62 y.o. patient presents today for 7 days of illness.  Patient notes that she tested positive for both COVID and influenza on PCR test.  She notes that she is feeling better, notes that she feels better in the morning but worse by afternoon/ evening.  Per who presents here concerned about left ear pain and sore throat.  Patient continues to have a severe cough and notes that she has coughing fits that make her throat is extremely sore.  Patient notes that it does hurt to swallow.  Patient is not taking anything for symptoms, just using rest and fluids.    ROS:  No fever, cough, nausea, changes in bowel movements or skin rash.        EXAM:  BP (!) 160/84 (BP Location: Left arm, Patient Position: Sitting, BP Cuff Size: Adult)   Pulse 68   Temp 36.1 °C (96.9 °F) (Temporal)   SpO2 98%   General: NAD, non-toxic appearance.  Eyes: PERRL, conjunctiva slightly injected, no photophobia or eye discharge.  Ears: Ears erythematous and bulging bilaterally, serous fluid behind TM left side.  Nares: Patent with thin, clear mucus.  Sinuses: Non-tender over maxillary and frontal sinuses.  Throat: Erythematous injection with lightly enlarged tonsils. NO exudates.   Neck: Supple, with shotty anterior cervical lymphadenopathy.  Lungs: Good air entry bilaterally, clear to auscultation. No wheeze, rhonchi or crackles. Normal respiratory effort.  Heart: Regular rate without murmur.  Abdomen: Soft and non-tender. No hepatosplenomegaly.  Skin: Warm and dry. No rash.     Results for orders placed or performed in visit on 12/22/22   POCT Rapid Strep A   Result Value Ref Range    Rapid Strep Screen N     Internal Control Positive Positive     Internal Control Negative Negative          ASSESSMENT:   1. Acute pharyngitis, unspecified etiology          PLAN:  1. Discussed benign nature of viral upper respiratory infections.  2. OTC anti-pyretics and decongestants as  needed. Supportive care advised.  3. Follow-up in office or urgent care for worsening symptoms, difficulty breathing, lack of expected recovery, or should new symptoms or problems arise.

## 2023-05-04 ENCOUNTER — OFFICE VISIT (OUTPATIENT)
Dept: MEDICAL GROUP | Facility: MEDICAL CENTER | Age: 63
End: 2023-05-04
Payer: COMMERCIAL

## 2023-05-04 VITALS
TEMPERATURE: 97.7 F | WEIGHT: 150 LBS | SYSTOLIC BLOOD PRESSURE: 122 MMHG | HEIGHT: 60 IN | RESPIRATION RATE: 16 BRPM | HEART RATE: 61 BPM | OXYGEN SATURATION: 98 % | DIASTOLIC BLOOD PRESSURE: 70 MMHG | BODY MASS INDEX: 29.45 KG/M2

## 2023-05-04 DIAGNOSIS — M25.562 ACUTE PAIN OF LEFT KNEE: ICD-10-CM

## 2023-05-04 DIAGNOSIS — T75.3XXA MOTION SICKNESS, INITIAL ENCOUNTER: ICD-10-CM

## 2023-05-04 PROCEDURE — 99213 OFFICE O/P EST LOW 20 MIN: CPT | Performed by: STUDENT IN AN ORGANIZED HEALTH CARE EDUCATION/TRAINING PROGRAM

## 2023-05-04 RX ORDER — SCOLOPAMINE TRANSDERMAL SYSTEM 1 MG/1
1 PATCH, EXTENDED RELEASE TRANSDERMAL
Qty: 4 PATCH | Refills: 0 | Status: SHIPPED | OUTPATIENT
Start: 2023-05-04 | End: 2023-08-04

## 2023-05-04 RX ORDER — CYCLOBENZAPRINE HCL 5 MG
5 TABLET ORAL NIGHTLY PRN
Qty: 10 TABLET | Refills: 0 | Status: SHIPPED | OUTPATIENT
Start: 2023-05-04

## 2023-05-04 ASSESSMENT — FIBROSIS 4 INDEX: FIB4 SCORE: 1.43

## 2023-05-04 ASSESSMENT — PATIENT HEALTH QUESTIONNAIRE - PHQ9: CLINICAL INTERPRETATION OF PHQ2 SCORE: 0

## 2023-05-04 NOTE — PROGRESS NOTES
Subjective:     Chief Complaint   Patient presents with    Knee Pain     After a fall         HPI:   Lyn presents today with     Knee pain  Patient presents today for concern about left knee pain.  Patient had a fall yesterday that caused the pain.  Denies hearing a snap or pop.  Patient notes that she slipped down the stairs and her right leg went forward and left leg went back.  Patient is able to bear weight and walking around on today.  Patient notes a radiating pain from her mid back down her left leg after fall.  Patient notes that she did take some ibuprofen with relief yesterday.  Patient denies any locking or catching while walking.  Patient denies weakness.  No swelling or bruising.    Patient has had chronic pain in right knee, this was further evaluated on visit 9/2022.     Motion sickness  Patient notes that she gets motion sickness while even on an elevator.  Patient is about to go on a 10-day cruise through [place].        ROS:  Gen: no fevers/chills  Pulm: no sob, no cough  CV: no chest pain, no palpitations  GI: no nausea/vomiting, no diarrhea        Objective:     Exam:  /70 (BP Location: Right arm, Patient Position: Sitting, BP Cuff Size: Adult)   Pulse 61   Temp 36.5 °C (97.7 °F) (Temporal)   Resp 16   Ht 1.524 m (5')   Wt 68 kg (150 lb)   LMP 12/04/2008   SpO2 98%   BMI 29.29 kg/m²  Body mass index is 29.29 kg/m².    Gen: Alert and oriented, No apparent distress.  Neck: Neck is supple without lymphadenopathy.  Lungs: Normal effort, CTA bilaterally, no wheezes, rhonchi, or rales  CV: Regular rate and rhythm. No murmurs, rubs, or gallops.  Ext: No clubbing, cyanosis, edema.  l Knee: Full ROM, full strength, No TTP, No odilia, varus/valgus stress neg. no ecchymosis or erythema.    Assessment & Plan:     62 y.o. female with the following -     1. Acute pain of left knee  Acute, stable.  Patient with fall yesterday.  Patient appears to have muscle tightness in IT band and lower back.   Patient advised to use ibuprofen 400 mg every 6 hours and cyclobenzaprine as needed at night x3 days.  - cyclobenzaprine (FLEXERIL) 5 mg tablet; Take 1 Tablet by mouth at bedtime as needed for Muscle Spasms.  Dispense: 10 Tablet; Refill: 0    2. Motion sickness, initial encounter  Acute, stable.  Will have patient use scopolamine patches on upcoming vacation.  Discussed with patient use and side effects of medication.  - scopolamine (TRANSDERM SCOP, 1.5 MG,) 1 mg/72hr PATCH 72 HR; Place 1 Patch on the skin every 72 hours.  Dispense: 4 Patch; Refill: 0         No follow-ups on file.    Please note that this dictation was created using voice recognition software. I have made every reasonable attempt to correct obvious errors, but I expect that there are errors of grammar and possibly content that I did not discover before finalizing the note.

## 2023-07-17 DIAGNOSIS — E66.3 OVERWEIGHT (BMI 25.0-29.9): ICD-10-CM

## 2023-07-17 DIAGNOSIS — E55.9 VITAMIN D DEFICIENCY: ICD-10-CM

## 2023-07-17 DIAGNOSIS — E03.4 HYPOTHYROIDISM DUE TO ACQUIRED ATROPHY OF THYROID: ICD-10-CM

## 2023-07-20 ENCOUNTER — HOSPITAL ENCOUNTER (OUTPATIENT)
Dept: LAB | Facility: MEDICAL CENTER | Age: 63
End: 2023-07-20
Attending: STUDENT IN AN ORGANIZED HEALTH CARE EDUCATION/TRAINING PROGRAM
Payer: COMMERCIAL

## 2023-07-20 DIAGNOSIS — E03.4 HYPOTHYROIDISM DUE TO ACQUIRED ATROPHY OF THYROID: ICD-10-CM

## 2023-07-20 DIAGNOSIS — E66.3 OVERWEIGHT (BMI 25.0-29.9): ICD-10-CM

## 2023-07-20 DIAGNOSIS — E55.9 VITAMIN D DEFICIENCY: ICD-10-CM

## 2023-07-20 LAB
25(OH)D3 SERPL-MCNC: 25 NG/ML (ref 30–100)
ALBUMIN SERPL BCP-MCNC: 4.2 G/DL (ref 3.2–4.9)
ALBUMIN/GLOB SERPL: 1.2 G/DL
ALP SERPL-CCNC: 120 U/L (ref 30–99)
ALT SERPL-CCNC: 17 U/L (ref 2–50)
ANION GAP SERPL CALC-SCNC: 12 MMOL/L (ref 7–16)
AST SERPL-CCNC: 17 U/L (ref 12–45)
BILIRUB SERPL-MCNC: 0.5 MG/DL (ref 0.1–1.5)
BUN SERPL-MCNC: 13 MG/DL (ref 8–22)
CALCIUM ALBUM COR SERPL-MCNC: 8.7 MG/DL (ref 8.5–10.5)
CALCIUM SERPL-MCNC: 8.9 MG/DL (ref 8.5–10.5)
CHLORIDE SERPL-SCNC: 105 MMOL/L (ref 96–112)
CHOLEST SERPL-MCNC: 197 MG/DL (ref 100–199)
CO2 SERPL-SCNC: 21 MMOL/L (ref 20–33)
CREAT SERPL-MCNC: 0.69 MG/DL (ref 0.5–1.4)
GFR SERPLBLD CREATININE-BSD FMLA CKD-EPI: 98 ML/MIN/1.73 M 2
GLOBULIN SER CALC-MCNC: 3.6 G/DL (ref 1.9–3.5)
GLUCOSE SERPL-MCNC: 98 MG/DL (ref 65–99)
HDLC SERPL-MCNC: 63 MG/DL
LDLC SERPL CALC-MCNC: 119 MG/DL
POTASSIUM SERPL-SCNC: 4.4 MMOL/L (ref 3.6–5.5)
PROT SERPL-MCNC: 7.8 G/DL (ref 6–8.2)
SODIUM SERPL-SCNC: 138 MMOL/L (ref 135–145)
T4 FREE SERPL-MCNC: 1.53 NG/DL (ref 0.93–1.7)
TRIGL SERPL-MCNC: 73 MG/DL (ref 0–149)
TSH SERPL DL<=0.005 MIU/L-ACNC: 0.96 UIU/ML (ref 0.38–5.33)

## 2023-07-20 PROCEDURE — 84443 ASSAY THYROID STIM HORMONE: CPT

## 2023-07-20 PROCEDURE — 80061 LIPID PANEL: CPT

## 2023-07-20 PROCEDURE — 84439 ASSAY OF FREE THYROXINE: CPT

## 2023-07-20 PROCEDURE — 82306 VITAMIN D 25 HYDROXY: CPT

## 2023-07-20 PROCEDURE — 36415 COLL VENOUS BLD VENIPUNCTURE: CPT

## 2023-07-20 PROCEDURE — 80053 COMPREHEN METABOLIC PANEL: CPT

## 2023-08-04 ENCOUNTER — OFFICE VISIT (OUTPATIENT)
Dept: MEDICAL GROUP | Facility: MEDICAL CENTER | Age: 63
End: 2023-08-04
Payer: COMMERCIAL

## 2023-08-04 VITALS
HEIGHT: 60 IN | RESPIRATION RATE: 16 BRPM | TEMPERATURE: 97.6 F | HEART RATE: 70 BPM | BODY MASS INDEX: 29.45 KG/M2 | SYSTOLIC BLOOD PRESSURE: 124 MMHG | WEIGHT: 150 LBS | DIASTOLIC BLOOD PRESSURE: 70 MMHG | OXYGEN SATURATION: 96 %

## 2023-08-04 DIAGNOSIS — E03.4 HYPOTHYROIDISM DUE TO ACQUIRED ATROPHY OF THYROID: ICD-10-CM

## 2023-08-04 DIAGNOSIS — S96.912D STRAIN OF LEFT ANKLE, SUBSEQUENT ENCOUNTER: ICD-10-CM

## 2023-08-04 DIAGNOSIS — J30.2 SEASONAL ALLERGIES: ICD-10-CM

## 2023-08-04 DIAGNOSIS — J45.20 MILD INTERMITTENT ASTHMA, UNCOMPLICATED: ICD-10-CM

## 2023-08-04 DIAGNOSIS — Z86.19 HISTORY OF HEPATITIS C: ICD-10-CM

## 2023-08-04 DIAGNOSIS — R74.8 ELEVATED ALKALINE PHOSPHATASE LEVEL: ICD-10-CM

## 2023-08-04 PROCEDURE — 3074F SYST BP LT 130 MM HG: CPT | Performed by: STUDENT IN AN ORGANIZED HEALTH CARE EDUCATION/TRAINING PROGRAM

## 2023-08-04 PROCEDURE — 3078F DIAST BP <80 MM HG: CPT | Performed by: STUDENT IN AN ORGANIZED HEALTH CARE EDUCATION/TRAINING PROGRAM

## 2023-08-04 PROCEDURE — 99213 OFFICE O/P EST LOW 20 MIN: CPT | Performed by: STUDENT IN AN ORGANIZED HEALTH CARE EDUCATION/TRAINING PROGRAM

## 2023-08-04 NOTE — PROGRESS NOTES
Subjective:     Chief Complaint   Patient presents with    Lab Results     HPI:   Lyn presents today with   Vitamin D deficiency  Patient's lab revealed vitamin D deficiency at 25.  We will start patient on vitamin D supplement.    Elevated LDL  Patient's LDL cholesterol slightly elevated at 119, this is an increase from previous.  Continue to monitor.  Patient is not on medication for cholesterol.    Elevated transaminase  Patient's alk phos elevated at 120.  Patient has had continuously elevated alk phos.  Patient does have history of hepatitis C and a liver cyst.  Patient had last ultrasound 2021 normal.  We will continue to follow.    Hypothyroid  Patient's recent labs within normal limits.  Levothyroxine stable at 88 mcg.    Ankle pain  Patient presents today for concern about pain in her left medial ankle.  Patient notes that it hurts worse when walking, better with resting.  Patient notes that she does not bone pain at rest.  Patient denies injury or trauma.  Patient notes that it is an aching pain, not preventing her from walking or doing activity.  Patient encouraged to try a ankle brace to relieve strain on tendon.      ROS:  Gen: no fevers/chills  Pulm: no sob, no cough  CV: no chest pain, no palpitations  GI: no nausea/vomiting, no diarrhea      Objective:     Exam:  /70   Pulse 70   Temp 36.4 °C (97.6 °F)   Resp 16   Ht 1.524 m (5')   Wt 68 kg (150 lb)   LMP 12/04/2008   SpO2 96%   BMI 29.29 kg/m²  Body mass index is 29.29 kg/m².    Gen: Alert and oriented, No apparent distress.  Neck: Neck is supple without lymphadenopathy.  Lungs: Normal effort, CTA bilaterally, no wheezes, rhonchi, or rales  CV: Regular rate and rhythm. No murmurs, rubs, or gallops.  Ext: No clubbing, cyanosis, edema.    Assessment & Plan:     63 y.o. female with the following -     1. Seasonal allergies  Chronic, stable.  Patient notes no new concerns.  Patient taking Claritin as needed.  Patient using Flonase as  needed.    2. History of hepatitis C  3. Elevated alkaline phosphatase level  Chronic, stable.  Continue to monitor.    4. Strain of left ankle, subsequent encounter  Acute, stable.  Discussed symptomatic treatment with patient.    5. Hypothyroidism due to acquired atrophy of thyroid  Chronic, stable.  Labs within normal limits.  Continue on 88 mcg.    6. Mild intermittent asthma, uncomplicated  Chronic, stable.  Patient with albuterol inhaler to use as needed.    No follow-ups on file.    Please note that this dictation was created using voice recognition software. I have made every reasonable attempt to correct obvious errors, but I expect that there are errors of grammar and possibly content that I did not discover before finalizing the note.

## 2024-02-02 SDOH — ECONOMIC STABILITY: FOOD INSECURITY: WITHIN THE PAST 12 MONTHS, THE FOOD YOU BOUGHT JUST DIDN'T LAST AND YOU DIDN'T HAVE MONEY TO GET MORE.: NEVER TRUE

## 2024-02-02 SDOH — ECONOMIC STABILITY: INCOME INSECURITY: IN THE LAST 12 MONTHS, WAS THERE A TIME WHEN YOU WERE NOT ABLE TO PAY THE MORTGAGE OR RENT ON TIME?: NO

## 2024-02-02 SDOH — ECONOMIC STABILITY: INCOME INSECURITY: HOW HARD IS IT FOR YOU TO PAY FOR THE VERY BASICS LIKE FOOD, HOUSING, MEDICAL CARE, AND HEATING?: NOT HARD AT ALL

## 2024-02-02 SDOH — ECONOMIC STABILITY: HOUSING INSECURITY
IN THE LAST 12 MONTHS, WAS THERE A TIME WHEN YOU DID NOT HAVE A STEADY PLACE TO SLEEP OR SLEPT IN A SHELTER (INCLUDING NOW)?: NO

## 2024-02-02 SDOH — HEALTH STABILITY: PHYSICAL HEALTH: ON AVERAGE, HOW MANY MINUTES DO YOU ENGAGE IN EXERCISE AT THIS LEVEL?: PATIENT DECLINED

## 2024-02-02 SDOH — ECONOMIC STABILITY: FOOD INSECURITY: WITHIN THE PAST 12 MONTHS, YOU WORRIED THAT YOUR FOOD WOULD RUN OUT BEFORE YOU GOT MONEY TO BUY MORE.: NEVER TRUE

## 2024-02-02 SDOH — ECONOMIC STABILITY: TRANSPORTATION INSECURITY
IN THE PAST 12 MONTHS, HAS THE LACK OF TRANSPORTATION KEPT YOU FROM MEDICAL APPOINTMENTS OR FROM GETTING MEDICATIONS?: NO

## 2024-02-02 SDOH — ECONOMIC STABILITY: TRANSPORTATION INSECURITY
IN THE PAST 12 MONTHS, HAS LACK OF TRANSPORTATION KEPT YOU FROM MEETINGS, WORK, OR FROM GETTING THINGS NEEDED FOR DAILY LIVING?: NO

## 2024-02-02 SDOH — HEALTH STABILITY: PHYSICAL HEALTH: ON AVERAGE, HOW MANY DAYS PER WEEK DO YOU ENGAGE IN MODERATE TO STRENUOUS EXERCISE (LIKE A BRISK WALK)?: 0 DAYS

## 2024-02-02 SDOH — ECONOMIC STABILITY: HOUSING INSECURITY: IN THE LAST 12 MONTHS, HOW MANY PLACES HAVE YOU LIVED?: 1

## 2024-02-02 SDOH — HEALTH STABILITY: MENTAL HEALTH
STRESS IS WHEN SOMEONE FEELS TENSE, NERVOUS, ANXIOUS, OR CAN'T SLEEP AT NIGHT BECAUSE THEIR MIND IS TROUBLED. HOW STRESSED ARE YOU?: TO SOME EXTENT

## 2024-02-02 SDOH — ECONOMIC STABILITY: TRANSPORTATION INSECURITY
IN THE PAST 12 MONTHS, HAS LACK OF RELIABLE TRANSPORTATION KEPT YOU FROM MEDICAL APPOINTMENTS, MEETINGS, WORK OR FROM GETTING THINGS NEEDED FOR DAILY LIVING?: NO

## 2024-02-02 ASSESSMENT — LIFESTYLE VARIABLES
HOW OFTEN DO YOU HAVE SIX OR MORE DRINKS ON ONE OCCASION: NEVER
HOW OFTEN DO YOU HAVE A DRINK CONTAINING ALCOHOL: NEVER
HOW MANY STANDARD DRINKS CONTAINING ALCOHOL DO YOU HAVE ON A TYPICAL DAY: PATIENT DOES NOT DRINK
AUDIT-C TOTAL SCORE: 0
SKIP TO QUESTIONS 9-10: 1

## 2024-02-02 ASSESSMENT — SOCIAL DETERMINANTS OF HEALTH (SDOH)
HOW MANY DRINKS CONTAINING ALCOHOL DO YOU HAVE ON A TYPICAL DAY WHEN YOU ARE DRINKING: PATIENT DOES NOT DRINK
HOW HARD IS IT FOR YOU TO PAY FOR THE VERY BASICS LIKE FOOD, HOUSING, MEDICAL CARE, AND HEATING?: NOT HARD AT ALL
IN A TYPICAL WEEK, HOW MANY TIMES DO YOU TALK ON THE PHONE WITH FAMILY, FRIENDS, OR NEIGHBORS?: MORE THAN THREE TIMES A WEEK
HOW OFTEN DO YOU ATTENT MEETINGS OF THE CLUB OR ORGANIZATION YOU BELONG TO?: NEVER
HOW OFTEN DO YOU ATTEND CHURCH OR RELIGIOUS SERVICES?: NEVER
HOW OFTEN DO YOU GET TOGETHER WITH FRIENDS OR RELATIVES?: PATIENT DECLINED
DO YOU BELONG TO ANY CLUBS OR ORGANIZATIONS SUCH AS CHURCH GROUPS UNIONS, FRATERNAL OR ATHLETIC GROUPS, OR SCHOOL GROUPS?: NO
HOW OFTEN DO YOU HAVE SIX OR MORE DRINKS ON ONE OCCASION: NEVER
WITHIN THE PAST 12 MONTHS, YOU WORRIED THAT YOUR FOOD WOULD RUN OUT BEFORE YOU GOT THE MONEY TO BUY MORE: NEVER TRUE
IN A TYPICAL WEEK, HOW MANY TIMES DO YOU TALK ON THE PHONE WITH FAMILY, FRIENDS, OR NEIGHBORS?: MORE THAN THREE TIMES A WEEK
HOW OFTEN DO YOU HAVE A DRINK CONTAINING ALCOHOL: NEVER
HOW OFTEN DO YOU ATTENT MEETINGS OF THE CLUB OR ORGANIZATION YOU BELONG TO?: NEVER
HOW OFTEN DO YOU GET TOGETHER WITH FRIENDS OR RELATIVES?: PATIENT DECLINED
DO YOU BELONG TO ANY CLUBS OR ORGANIZATIONS SUCH AS CHURCH GROUPS UNIONS, FRATERNAL OR ATHLETIC GROUPS, OR SCHOOL GROUPS?: NO
HOW OFTEN DO YOU ATTEND CHURCH OR RELIGIOUS SERVICES?: NEVER

## 2024-02-05 ENCOUNTER — OFFICE VISIT (OUTPATIENT)
Dept: MEDICAL GROUP | Facility: PHYSICIAN GROUP | Age: 64
End: 2024-02-05
Payer: COMMERCIAL

## 2024-02-05 VITALS
SYSTOLIC BLOOD PRESSURE: 130 MMHG | TEMPERATURE: 97.5 F | OXYGEN SATURATION: 98 % | WEIGHT: 150.8 LBS | HEIGHT: 60 IN | DIASTOLIC BLOOD PRESSURE: 62 MMHG | HEART RATE: 80 BPM | BODY MASS INDEX: 29.61 KG/M2 | RESPIRATION RATE: 20 BRPM

## 2024-02-05 DIAGNOSIS — N39.3 STRESS INCONTINENCE OF URINE: ICD-10-CM

## 2024-02-05 DIAGNOSIS — J30.2 SEASONAL ALLERGIES: ICD-10-CM

## 2024-02-05 DIAGNOSIS — Z13.6 SCREENING FOR CARDIOVASCULAR CONDITION: ICD-10-CM

## 2024-02-05 DIAGNOSIS — E66.3 OVERWEIGHT (BMI 25.0-29.9): ICD-10-CM

## 2024-02-05 DIAGNOSIS — Z13.228 SCREENING FOR ENDOCRINE, METABOLIC AND IMMUNITY DISORDER: ICD-10-CM

## 2024-02-05 DIAGNOSIS — Z86.19 HISTORY OF HEPATITIS C: ICD-10-CM

## 2024-02-05 DIAGNOSIS — E55.9 VITAMIN D DEFICIENCY: ICD-10-CM

## 2024-02-05 DIAGNOSIS — E03.4 HYPOTHYROIDISM DUE TO ACQUIRED ATROPHY OF THYROID: ICD-10-CM

## 2024-02-05 DIAGNOSIS — Z13.29 SCREENING FOR ENDOCRINE, METABOLIC AND IMMUNITY DISORDER: ICD-10-CM

## 2024-02-05 DIAGNOSIS — Z13.0 SCREENING FOR ENDOCRINE, METABOLIC AND IMMUNITY DISORDER: ICD-10-CM

## 2024-02-05 PROBLEM — Z90.710 HX OF VAGINAL HYSTERECTOMY: Status: RESOLVED | Noted: 2021-03-30 | Resolved: 2024-02-05

## 2024-02-05 PROBLEM — Z96.0 VAGINAL PESSARY PRESENT: Status: RESOLVED | Noted: 2021-06-17 | Resolved: 2024-02-05

## 2024-02-05 PROBLEM — K76.89 LIVER CYST: Status: RESOLVED | Noted: 2017-09-28 | Resolved: 2024-02-05

## 2024-02-05 PROBLEM — N81.11 PROLAPSE OF VAGINAL WALL WITH MIDLINE CYSTOCELE: Status: RESOLVED | Noted: 2021-03-30 | Resolved: 2024-02-05

## 2024-02-05 PROCEDURE — 3075F SYST BP GE 130 - 139MM HG: CPT

## 2024-02-05 PROCEDURE — 99214 OFFICE O/P EST MOD 30 MIN: CPT

## 2024-02-05 PROCEDURE — 3078F DIAST BP <80 MM HG: CPT

## 2024-02-05 RX ORDER — LEVOTHYROXINE SODIUM 88 UG/1
88 TABLET ORAL DAILY
Qty: 90 TABLET | Refills: 3 | Status: SHIPPED | OUTPATIENT
Start: 2024-02-05

## 2024-02-05 ASSESSMENT — ENCOUNTER SYMPTOMS
NAUSEA: 0
WHEEZING: 0
BLOOD IN STOOL: 0
CHILLS: 0
SHORTNESS OF BREATH: 0
CONSTIPATION: 0
DIZZINESS: 0
FEVER: 0
ABDOMINAL PAIN: 0
HEADACHES: 0
DIARRHEA: 0
TINGLING: 0
VOMITING: 0
PALPITATIONS: 0
WEIGHT LOSS: 0
COUGH: 0

## 2024-02-05 ASSESSMENT — PATIENT HEALTH QUESTIONNAIRE - PHQ9: CLINICAL INTERPRETATION OF PHQ2 SCORE: 0

## 2024-02-05 NOTE — ASSESSMENT & PLAN NOTE
Patient struggles with stress incontinence and has for the last several years.  Patient previously had a vaginal pessary in place for a vaginal wall prolapse.  This was removed when she stopped working.  Patient still struggles with stress incontinence from time to time typically with coughing, sneezing, laughing.  Patient also reports that she feels like her bladder does not fully empty but once she leans forward she is able to completely empty her bladder.

## 2024-02-05 NOTE — PROGRESS NOTES
"Subjective:     Chief Complaint   Patient presents with    Establish Care    Medication Refill     Thyroid med     Lyn Morris is a 63 y.o. female, who is here today to establish care and discuss thyroid medications. Her prior PCP was Gabrielle Morgan PA-C.    Problem   Hypothyroidism Due to Acquired Atrophy of Thyroid     Patient began having thyroid issues after she was treated for hepatitis C, which was around 2002.  Only taking levothyroxine 88 mcg daily.  Has not had a dose increase in many years.  Last dose increased patient felt \"off\" and was decreased shortly after this.     Seasonal Allergies    Patient experiences seasonal allergies mostly surrounding nasal congestion and inflammation.  Patient will take Flonase twice daily as well as Claritin.  Patient does report having some epistaxis.  Does not use any other type medication.     Mild Intermittent Asthma, Uncomplicated    Patient uses albuterol inhaler as needed.  Typically use is approximately once a month due to bronchospasms from phlegm.  Patient has been experiencing this for the last few years.  Symptoms are well-controlled with albuterol inhaler.     Urinary Incontinence    Patient struggles with stress incontinence and has for the last several years.  Patient previously had a vaginal pessary in place for a vaginal wall prolapse.  This was removed when she stopped working.  Patient still struggles with stress incontinence from time to time typically with coughing, sneezing, laughing.  Patient also reports that she feels like her bladder does not fully empty but once she leans forward she is able to completely empty her bladder.     History of Hepatitis C    Diagnosed around 2002.  Reports that was she was treated with injections.     Vaginal Pessary Present (Resolved)   Prolapse of Vaginal Wall With Midline Cystocele (Resolved)   Hx of Vaginal Hysterectomy (Resolved)   Liver Cyst (Resolved)     Allergies: Patient has no known allergies.    Current " "Outpatient Medications:     levothyroxine (SYNTHROID) 88 MCG Tab, Take 1 Tablet by mouth every day., Disp: 90 Tablet, Rfl: 3    Cholecalciferol (VITAMIN D3) 125 MCG (5000 UT) Cap, Take 1 Capsule by mouth every day., Disp: , Rfl:     cyclobenzaprine (FLEXERIL) 5 mg tablet, Take 1 Tablet by mouth at bedtime as needed for Muscle Spasms., Disp: 10 Tablet, Rfl: 0    fluticasone (FLONASE) 50 MCG/ACT nasal spray, Administer 1 Spray into affected nostril(S) every day., Disp: 16 g, Rfl: 0    albuterol 108 (90 Base) MCG/ACT Aero Soln inhalation aerosol, Inhale 2 Puffs every 6 hours as needed for Shortness of Breath., Disp: 8.5 g, Rfl: 3     Review of Systems   Constitutional:  Negative for chills, fever and weight loss.   Respiratory:  Negative for cough, shortness of breath and wheezing.    Cardiovascular:  Negative for chest pain, palpitations and leg swelling.   Gastrointestinal:  Negative for abdominal pain, blood in stool, constipation, diarrhea, nausea and vomiting.   Genitourinary:  Negative for hematuria.   Skin:  Negative for itching and rash.   Neurological:  Negative for dizziness, tingling and headaches.     Health Maintenance: Completed    Objective:     /62   Pulse 80   Temp 36.4 °C (97.5 °F)   Resp 20   Ht 1.515 m (4' 11.65\")   Wt 68.4 kg (150 lb 12.8 oz)   SpO2 98%  Body mass index is 29.8 kg/m².    Physical Exam  Vitals reviewed.   Constitutional:       General: She is not in acute distress.     Appearance: Normal appearance. She is not ill-appearing.   Cardiovascular:      Rate and Rhythm: Normal rate and regular rhythm.      Heart sounds: Normal heart sounds.   Pulmonary:      Effort: Pulmonary effort is normal.      Breath sounds: Normal breath sounds.   Abdominal:      General: Abdomen is flat.      Palpations: Abdomen is soft.   Musculoskeletal:      Cervical back: Normal range of motion.   Skin:     General: Skin is warm and dry.   Neurological:      General: No focal deficit present.      " Mental Status: She is alert and oriented to person, place, and time.   Psychiatric:         Mood and Affect: Mood normal.         Behavior: Behavior normal.         Thought Content: Thought content normal.         Judgment: Judgment normal.        Assessment & Plan:     The following plan was discussed through shared decision making with the patient:    1. Hypothyroidism due to acquired atrophy of thyroid  Chronic, controlled.  She is on levothyroxine and tolerating it well. Last TSH in 7/2023 was within normal limits, so we will continue the current dosing.  Refills provided today.  Will get updated lab work in 6 months and follow-up in office after this lab work to discuss further management.  - TSH; Future  - FREE THYROXINE; Future  - levothyroxine (SYNTHROID) 88 MCG Tab; Take 1 Tablet by mouth every day.  Dispense: 90 Tablet; Refill: 3    2. Seasonal allergies  Chronic, controlled.  Discussed that not one medication is likely to provide complete relief. Discussed allergen avoidance and environment modification when possible, showering and shampooing before bed, taking shoes off indoors so not tracking pollen in home.  Discussed that medications are more effective with daily use, especially two weeks in duration.  Advised Zyrtec or Allegra OTC, Nasal steroid or antihistamine, OTC allergy eye drops prn.     3. Stress incontinence of urine  Chronic, uncontrolled.  Discussed with patient Kegels as well as offsetting the pressure upon her pelvic floor when she feels a cough or sneeze coming on to help avoid or decrease the amount of incontinence.  Discussed with patient possible referral in the future for pelvic floor physical therapy.  If this worsens we may consider referral to urogynecology in the future as well.    4. Overweight (BMI 25.0-29.9)  Recommendations for healthy lifestyle include:  - Good rule of thumb for portions = size of the palm of your hand  - Eliminate or decrease the amount of all white  carbohydrates such as white rice, white bread, white pasta, potatoes, etc.    - Focus on whole foods that are less processed foods and more of plant/animal proteins; great examples are lean poultry or fish, vegetables, nuts, beans, and berries.   - Aim for at least 15-20 grams of fiber per day.    - Avoid drinking sugary beverages such as juice, soda, specialty coffee; these have very little nutritional value and are high in calories.   - Exercise: 150 minutes of moderate aerobic activity per week OR 75 minutes of vigorous aerobic activity per week + 2 days per week of strength    5. History of hepatitis C  Establishing with a new a primary care provider will be due for updated lab work in 6 months.  Provided labs today to complete and follow-up in office afterwards.  - Comp Metabolic Panel; Future  - ALKALINE PHOSPHATASE ISOENZYMES; Future    6. Screening for endocrine, metabolic and immunity disorder  See #5  - CBC WITH DIFFERENTIAL; Future    7. Screening for cardiovascular condition  See #5  - Lipid Profile; Future    8. Vitamin D deficiency  See #5  - VITAMIN D,25 HYDROXY (DEFICIENCY); Future    Return in about 6 months (around 8/5/2024) for Labs, Med Check.         Please note that this dictation was created using voice recognition software. I have made every reasonable attempt to correct obvious errors, but I expect that there are errors of grammar and possibly content that I did not discover before finalizing the note.    JUAN Pisano  [facility]  [facility]

## 2024-06-17 ENCOUNTER — HOSPITAL ENCOUNTER (OUTPATIENT)
Dept: LAB | Facility: MEDICAL CENTER | Age: 64
End: 2024-06-17
Payer: COMMERCIAL

## 2024-06-17 DIAGNOSIS — Z13.228 SCREENING FOR ENDOCRINE, METABOLIC AND IMMUNITY DISORDER: ICD-10-CM

## 2024-06-17 DIAGNOSIS — Z13.6 SCREENING FOR CARDIOVASCULAR CONDITION: ICD-10-CM

## 2024-06-17 DIAGNOSIS — Z13.0 SCREENING FOR ENDOCRINE, METABOLIC AND IMMUNITY DISORDER: ICD-10-CM

## 2024-06-17 DIAGNOSIS — E66.3 OVERWEIGHT (BMI 25.0-29.9): ICD-10-CM

## 2024-06-17 DIAGNOSIS — Z86.19 HISTORY OF HEPATITIS C: ICD-10-CM

## 2024-06-17 DIAGNOSIS — E03.4 HYPOTHYROIDISM DUE TO ACQUIRED ATROPHY OF THYROID: ICD-10-CM

## 2024-06-17 DIAGNOSIS — Z13.29 SCREENING FOR ENDOCRINE, METABOLIC AND IMMUNITY DISORDER: ICD-10-CM

## 2024-06-17 DIAGNOSIS — E55.9 VITAMIN D DEFICIENCY: ICD-10-CM

## 2024-06-17 LAB
BASOPHILS # BLD AUTO: 0.8 % (ref 0–1.8)
BASOPHILS # BLD: 0.04 K/UL (ref 0–0.12)
EOSINOPHIL # BLD AUTO: 0.04 K/UL (ref 0–0.51)
EOSINOPHIL NFR BLD: 0.8 % (ref 0–6.9)
ERYTHROCYTE [DISTWIDTH] IN BLOOD BY AUTOMATED COUNT: 46 FL (ref 35.9–50)
HCT VFR BLD AUTO: 44.6 % (ref 37–47)
HGB BLD-MCNC: 15.2 G/DL (ref 12–16)
IMM GRANULOCYTES # BLD AUTO: 0.01 K/UL (ref 0–0.11)
IMM GRANULOCYTES NFR BLD AUTO: 0.2 % (ref 0–0.9)
LYMPHOCYTES # BLD AUTO: 1.64 K/UL (ref 1–4.8)
LYMPHOCYTES NFR BLD: 31.7 % (ref 22–41)
MCH RBC QN AUTO: 33.3 PG (ref 27–33)
MCHC RBC AUTO-ENTMCNC: 34.1 G/DL (ref 32.2–35.5)
MCV RBC AUTO: 97.8 FL (ref 81.4–97.8)
MONOCYTES # BLD AUTO: 0.46 K/UL (ref 0–0.85)
MONOCYTES NFR BLD AUTO: 8.9 % (ref 0–13.4)
NEUTROPHILS # BLD AUTO: 2.99 K/UL (ref 1.82–7.42)
NEUTROPHILS NFR BLD: 57.6 % (ref 44–72)
NRBC # BLD AUTO: 0 K/UL
NRBC BLD-RTO: 0 /100 WBC (ref 0–0.2)
PLATELET # BLD AUTO: 240 K/UL (ref 164–446)
PMV BLD AUTO: 10.7 FL (ref 9–12.9)
RBC # BLD AUTO: 4.56 M/UL (ref 4.2–5.4)
WBC # BLD AUTO: 5.2 K/UL (ref 4.8–10.8)

## 2024-06-17 PROCEDURE — 82306 VITAMIN D 25 HYDROXY: CPT

## 2024-06-17 PROCEDURE — 84075 ASSAY ALKALINE PHOSPHATASE: CPT

## 2024-06-17 PROCEDURE — 84443 ASSAY THYROID STIM HORMONE: CPT

## 2024-06-17 PROCEDURE — 84439 ASSAY OF FREE THYROXINE: CPT

## 2024-06-17 PROCEDURE — 85025 COMPLETE CBC W/AUTO DIFF WBC: CPT

## 2024-06-17 PROCEDURE — 80053 COMPREHEN METABOLIC PANEL: CPT

## 2024-06-17 PROCEDURE — 36415 COLL VENOUS BLD VENIPUNCTURE: CPT

## 2024-06-17 PROCEDURE — 84080 ASSAY ALKALINE PHOSPHATASES: CPT

## 2024-06-17 PROCEDURE — 80061 LIPID PANEL: CPT

## 2024-06-18 LAB
25(OH)D3 SERPL-MCNC: 52 NG/ML (ref 30–100)
ALBUMIN SERPL BCP-MCNC: 4.3 G/DL (ref 3.2–4.9)
ALBUMIN/GLOB SERPL: 1.3 G/DL
ALP SERPL-CCNC: 138 U/L (ref 30–99)
ALT SERPL-CCNC: 22 U/L (ref 2–50)
ANION GAP SERPL CALC-SCNC: 12 MMOL/L (ref 7–16)
AST SERPL-CCNC: 23 U/L (ref 12–45)
BILIRUB SERPL-MCNC: 0.5 MG/DL (ref 0.1–1.5)
BUN SERPL-MCNC: 11 MG/DL (ref 8–22)
CALCIUM ALBUM COR SERPL-MCNC: 9.2 MG/DL (ref 8.5–10.5)
CALCIUM SERPL-MCNC: 9.4 MG/DL (ref 8.5–10.5)
CHLORIDE SERPL-SCNC: 104 MMOL/L (ref 96–112)
CHOLEST SERPL-MCNC: 200 MG/DL (ref 100–199)
CO2 SERPL-SCNC: 24 MMOL/L (ref 20–33)
CREAT SERPL-MCNC: 0.56 MG/DL (ref 0.5–1.4)
FASTING STATUS PATIENT QL REPORTED: NORMAL
GFR SERPLBLD CREATININE-BSD FMLA CKD-EPI: 103 ML/MIN/1.73 M 2
GLOBULIN SER CALC-MCNC: 3.3 G/DL (ref 1.9–3.5)
GLUCOSE SERPL-MCNC: 92 MG/DL (ref 65–99)
HDLC SERPL-MCNC: 68 MG/DL
LDLC SERPL CALC-MCNC: 115 MG/DL
POTASSIUM SERPL-SCNC: 4 MMOL/L (ref 3.6–5.5)
PROT SERPL-MCNC: 7.6 G/DL (ref 6–8.2)
SODIUM SERPL-SCNC: 140 MMOL/L (ref 135–145)
T4 FREE SERPL-MCNC: 1.67 NG/DL (ref 0.93–1.7)
TRIGL SERPL-MCNC: 83 MG/DL (ref 0–149)
TSH SERPL DL<=0.005 MIU/L-ACNC: 0.79 UIU/ML (ref 0.38–5.33)

## 2024-06-19 LAB
ALP BONE SERPL-CCNC: 63 U/L (ref 0–55)
ALP ISOS SERPL HS-CCNC: 0 U/L
ALP LIVER SERPL-CCNC: 77 U/L (ref 0–94)
ALP SERPL-CCNC: 140 U/L (ref 40–120)

## 2024-07-11 DIAGNOSIS — R74.8 ELEVATED ALKALINE PHOSPHATASE LEVEL: ICD-10-CM

## 2024-07-25 ENCOUNTER — HOSPITAL ENCOUNTER (OUTPATIENT)
Dept: RADIOLOGY | Facility: MEDICAL CENTER | Age: 64
End: 2024-07-25
Payer: COMMERCIAL

## 2024-07-25 DIAGNOSIS — R74.8 ELEVATED ALKALINE PHOSPHATASE LEVEL: ICD-10-CM

## 2024-07-25 PROCEDURE — A9503 TC99M MEDRONATE: HCPCS

## 2024-07-30 ENCOUNTER — OFFICE VISIT (OUTPATIENT)
Dept: MEDICAL GROUP | Facility: PHYSICIAN GROUP | Age: 64
End: 2024-07-30
Payer: COMMERCIAL

## 2024-07-30 VITALS
SYSTOLIC BLOOD PRESSURE: 136 MMHG | RESPIRATION RATE: 18 BRPM | WEIGHT: 156.38 LBS | TEMPERATURE: 97.6 F | HEIGHT: 60 IN | BODY MASS INDEX: 30.7 KG/M2 | HEART RATE: 88 BPM | DIASTOLIC BLOOD PRESSURE: 72 MMHG | OXYGEN SATURATION: 96 %

## 2024-07-30 DIAGNOSIS — E78.5 DYSLIPIDEMIA: ICD-10-CM

## 2024-07-30 DIAGNOSIS — R74.8 ELEVATED ALKALINE PHOSPHATASE LEVEL: ICD-10-CM

## 2024-07-30 DIAGNOSIS — E03.4 HYPOTHYROIDISM DUE TO ACQUIRED ATROPHY OF THYROID: ICD-10-CM

## 2024-07-30 ASSESSMENT — FIBROSIS 4 INDEX: FIB4 SCORE: 1.29

## 2024-08-20 DIAGNOSIS — N64.89 BREAST ASYMMETRY: ICD-10-CM

## 2024-08-20 NOTE — PROGRESS NOTES
Abnormal mammogram from [facility].  Follow-up imaging studies including a right diagnostic mammogram and ultrasound.    Orders have been placed and contacted patient to let her know that there are new orders in the system for her to follow-up on

## 2025-01-10 ENCOUNTER — HOSPITAL ENCOUNTER (EMERGENCY)
Facility: MEDICAL CENTER | Age: 65
End: 2025-01-10
Attending: STUDENT IN AN ORGANIZED HEALTH CARE EDUCATION/TRAINING PROGRAM
Payer: COMMERCIAL

## 2025-01-10 ENCOUNTER — APPOINTMENT (OUTPATIENT)
Dept: RADIOLOGY | Facility: MEDICAL CENTER | Age: 65
End: 2025-01-10
Attending: STUDENT IN AN ORGANIZED HEALTH CARE EDUCATION/TRAINING PROGRAM
Payer: COMMERCIAL

## 2025-01-10 VITALS
OXYGEN SATURATION: 95 % | HEIGHT: 60 IN | TEMPERATURE: 98.2 F | BODY MASS INDEX: 31.29 KG/M2 | SYSTOLIC BLOOD PRESSURE: 187 MMHG | RESPIRATION RATE: 18 BRPM | DIASTOLIC BLOOD PRESSURE: 84 MMHG | HEART RATE: 82 BPM | WEIGHT: 159.39 LBS

## 2025-01-10 DIAGNOSIS — R07.9 CHEST PAIN, UNSPECIFIED TYPE: Primary | ICD-10-CM

## 2025-01-10 LAB
ALBUMIN SERPL BCP-MCNC: 4.2 G/DL (ref 3.2–4.9)
ALBUMIN/GLOB SERPL: 1.2 G/DL
ALP SERPL-CCNC: 143 U/L (ref 30–99)
ALT SERPL-CCNC: 15 U/L (ref 2–50)
ANION GAP SERPL CALC-SCNC: 11 MMOL/L (ref 7–16)
AST SERPL-CCNC: 17 U/L (ref 12–45)
BASOPHILS # BLD AUTO: 0.9 % (ref 0–1.8)
BASOPHILS # BLD: 0.07 K/UL (ref 0–0.12)
BILIRUB SERPL-MCNC: 0.4 MG/DL (ref 0.1–1.5)
BUN SERPL-MCNC: 12 MG/DL (ref 8–22)
CALCIUM ALBUM COR SERPL-MCNC: 9 MG/DL (ref 8.5–10.5)
CALCIUM SERPL-MCNC: 9.2 MG/DL (ref 8.4–10.2)
CHLORIDE SERPL-SCNC: 104 MMOL/L (ref 96–112)
CO2 SERPL-SCNC: 24 MMOL/L (ref 20–33)
CREAT SERPL-MCNC: 0.61 MG/DL (ref 0.5–1.4)
D DIMER PPP IA.FEU-MCNC: 0.54 UG/ML (FEU) (ref 0–0.5)
EOSINOPHIL # BLD AUTO: 0.05 K/UL (ref 0–0.51)
EOSINOPHIL NFR BLD: 0.6 % (ref 0–6.9)
ERYTHROCYTE [DISTWIDTH] IN BLOOD BY AUTOMATED COUNT: 46.4 FL (ref 35.9–50)
GFR SERPLBLD CREATININE-BSD FMLA CKD-EPI: 100 ML/MIN/1.73 M 2
GLOBULIN SER CALC-MCNC: 3.6 G/DL (ref 1.9–3.5)
GLUCOSE SERPL-MCNC: 99 MG/DL (ref 65–99)
HCT VFR BLD AUTO: 44.8 % (ref 37–47)
HGB BLD-MCNC: 15.3 G/DL (ref 12–16)
IMM GRANULOCYTES # BLD AUTO: 0.01 K/UL (ref 0–0.11)
IMM GRANULOCYTES NFR BLD AUTO: 0.1 % (ref 0–0.9)
LYMPHOCYTES # BLD AUTO: 1.89 K/UL (ref 1–4.8)
LYMPHOCYTES NFR BLD: 23 % (ref 22–41)
MCH RBC QN AUTO: 33.8 PG (ref 27–33)
MCHC RBC AUTO-ENTMCNC: 34.2 G/DL (ref 32.2–35.5)
MCV RBC AUTO: 98.9 FL (ref 81.4–97.8)
MONOCYTES # BLD AUTO: 0.78 K/UL (ref 0–0.85)
MONOCYTES NFR BLD AUTO: 9.5 % (ref 0–13.4)
NEUTROPHILS # BLD AUTO: 5.4 K/UL (ref 1.82–7.42)
NEUTROPHILS NFR BLD: 65.9 % (ref 44–72)
NRBC # BLD AUTO: 0 K/UL
NRBC BLD-RTO: 0 /100 WBC (ref 0–0.2)
PLATELET # BLD AUTO: 224 K/UL (ref 164–446)
PMV BLD AUTO: 10.1 FL (ref 9–12.9)
POTASSIUM SERPL-SCNC: 3.6 MMOL/L (ref 3.6–5.5)
PROT SERPL-MCNC: 7.8 G/DL (ref 6–8.2)
RBC # BLD AUTO: 4.53 M/UL (ref 4.2–5.4)
SODIUM SERPL-SCNC: 139 MMOL/L (ref 135–145)
TROPONIN T SERPL-MCNC: <6 NG/L (ref 6–19)
WBC # BLD AUTO: 8.2 K/UL (ref 4.8–10.8)

## 2025-01-10 PROCEDURE — 99283 EMERGENCY DEPT VISIT LOW MDM: CPT

## 2025-01-10 PROCEDURE — 80053 COMPREHEN METABOLIC PANEL: CPT

## 2025-01-10 PROCEDURE — 93005 ELECTROCARDIOGRAM TRACING: CPT | Mod: TC | Performed by: STUDENT IN AN ORGANIZED HEALTH CARE EDUCATION/TRAINING PROGRAM

## 2025-01-10 PROCEDURE — 84484 ASSAY OF TROPONIN QUANT: CPT

## 2025-01-10 PROCEDURE — 36415 COLL VENOUS BLD VENIPUNCTURE: CPT

## 2025-01-10 PROCEDURE — 85025 COMPLETE CBC W/AUTO DIFF WBC: CPT

## 2025-01-10 PROCEDURE — 85379 FIBRIN DEGRADATION QUANT: CPT

## 2025-01-10 PROCEDURE — 71045 X-RAY EXAM CHEST 1 VIEW: CPT

## 2025-01-10 ASSESSMENT — PAIN DESCRIPTION - PAIN TYPE: TYPE: ACUTE PAIN

## 2025-01-10 ASSESSMENT — FIBROSIS 4 INDEX: FIB4 SCORE: 1.29

## 2025-01-11 LAB — EKG IMPRESSION: NORMAL

## 2025-01-11 NOTE — ED PROVIDER NOTES
ED Provider Note    CHIEF COMPLAINT  Chief Complaint   Patient presents with    Breast Pain     Started yesterday morning having pain to L breast   does not hurt to pressure breasts  just pain under breast   now pain is spreading to other breast as well  pain comes and goes  no pain at this time     Other     About 3 days ago started having chills  then nothing today         EXTERNAL RECORDS REVIEWED  Outpatient Notes patient was seen by her family nurse practitioner in August 2020 for and she had some asymmetry of her right breast therefore orders for right breast ultrasound and mammogram were placed but these never appear to be done    HPI/ROS  LIMITATION TO HISTORY   Select: : None  OUTSIDE HISTORIAN(S):  None    Lyn Morris is a 64 y.o. female who presents for evaluation of transient breast pain that has been ongoing since yesterday morning.  She states that initially was in the left breast.  Is actually underneath the left breast.  It went away when she woke up this morning.  She went on a walk and she felt some pain in her right breast.  She is not have any nipple discharge.  There is no overlying skin changes.  She is not feeling short of breath.  She notes that she does have a pretty strong family history of breast cancer.  She is unsure when the youngest female was diagnosed with breast cancer.  She does get regular mammograms and she states that the last one was earlier this year.  She is never been diagnosed with breast cancer.  She denies any history of hypertension or hyperlipidemia.    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    SURGICAL HISTORY    "has a past surgical history that includes vaginal hysterectomy total (12/11/2008); anterior and posterior repair (12/11/2008); colonoscopy (01/01/2003); lumpectomy; and tubal coagulation laparoscopic bilateral.    FAMILY HISTORY  Family History   Problem Relation Age of Onset    Hypertension Father     Diabetes Father     Colorectal Cancer Father     Breast Cancer Sister     Breast Cancer Sister     Hypertension Brother     Diabetes Brother     Hyperlipidemia Brother     Breast Cancer Paternal Aunt     Colorectal Cancer Paternal Uncle     Heart Disease Neg Hx     Stroke Neg Hx     Dementia Neg Hx        SOCIAL HISTORY  Social History     Tobacco Use    Smoking status: Never    Smokeless tobacco: Never   Vaping Use    Vaping status: Never Used   Substance and Sexual Activity    Alcohol use: No    Drug use: No    Sexual activity: Not Currently       CURRENT MEDICATIONS  Home Medications       Reviewed by Lauren Garrison R.N. (Registered Nurse) on 01/10/25 at 1803  Med List Status: Partial     Medication Last Dose Status   albuterol 108 (90 Base) MCG/ACT Aero Soln inhalation aerosol  Active   Cholecalciferol (VITAMIN D3) 125 MCG (5000 UT) Cap  Active   cyclobenzaprine (FLEXERIL) 5 mg tablet  Active   fluticasone (FLONASE) 50 MCG/ACT nasal spray  Active   levothyroxine (SYNTHROID) 88 MCG Tab  Active                    ALLERGIES  No Known Allergies    PHYSICAL EXAM  VITAL SIGNS: BP (!) 197/100   Pulse 81   Temp 36.8 °C (98.2 °F) (Temporal)   Resp 16   Ht 1.511 m (4' 11.5\")   Wt 72.3 kg (159 lb 6.3 oz)   LMP 12/04/2008   SpO2 95%   BMI 31.65 kg/m²      Constitutional: Well developed, Well nourished, No acute respiratory distress, Non-toxic appearance.   HENT: Normocephalic, Atraumatic, Bilateral external ears normal, Oropharynx clear, mucous membranes are moist.  Eyes: Conjunctiva normal, No discharge. No icterus.  Neck: Normal range of motion. Supple.  Breast: Bilateral breasts without tenderness, no nipple " discharge or nipple inversion, no areas of swelling or induration or redness  Cardiovascular: Normal heart rate, Normal rhythm, No murmurs, No rubs, No gallops.   Thorax & Lungs: Clear to auscultation bilaterally, No respiratory distress, No wheezing.  Abdomen: Soft nontender no rebound masses or peritoneal signs  Skin: Warm, Dry, No erythema, No rash.   Extremities: Intact distal pulses, No edema, No tenderness  Musculoskeletal: Good range of motion in all major joints. Normal gait.  Neurologic: Alert & oriented. No focal deficits noted.   Psych: Alert normal affect       EKG/LABS  Results for orders placed or performed during the hospital encounter of 01/10/25   EKG (Now)    Collection Time: 01/10/25  8:32 PM   Result Value Ref Range    Report       [facility] Emergency Dept.    Test Date:  2025-01-10  Pt Name:    MICHAEL GARCIA                Department: [facility]  MRN:        [id number]                      [facility]   Gender:     Female                       Technician: [id number]  :        1960                   Requested By:LUCIANA BURCH  Order #:    [id number]                    Reading MD:    Measurements  Intervals                                Axis  Rate:       78                           P:          64  AK:         161                          QRS:        33  QRSD:       90                           T:          57  QT:         415  QTc:        473    Interpretive Statements  Sinus rhythm  Compared to ECG 2018 11:30:57  No significant changes     CBC WITH DIFFERENTIAL    Collection Time: 01/10/25  8:40 PM   Result Value Ref Range    WBC 8.2 4.8 - 10.8 K/uL    RBC 4.53 4.20 - 5.40 M/uL    Hemoglobin 15.3 12.0 - 16.0 g/dL    Hematocrit 44.8 37.0 - 47.0 %    MCV 98.9 (H) 81.4 - 97.8 fL    MCH 33.8 (H) 27.0 - 33.0 pg    MCHC 34.2 32.2 - 35.5 g/dL    RDW 46.4 35.9 - 50.0 fL    Platelet Count 224 164 - 446 K/uL    MPV 10.1 9.0 - 12.9 fL    Neutrophils-Polys 65.90  44.00 - 72.00 %    Lymphocytes 23.00 22.00 - 41.00 %    Monocytes 9.50 0.00 - 13.40 %    Eosinophils 0.60 0.00 - 6.90 %    Basophils 0.90 0.00 - 1.80 %    Immature Granulocytes 0.10 0.00 - 0.90 %    Nucleated RBC 0.00 0.00 - 0.20 /100 WBC    Neutrophils (Absolute) 5.40 1.82 - 7.42 K/uL    Lymphs (Absolute) 1.89 1.00 - 4.80 K/uL    Monos (Absolute) 0.78 0.00 - 0.85 K/uL    Eos (Absolute) 0.05 0.00 - 0.51 K/uL    Baso (Absolute) 0.07 0.00 - 0.12 K/uL    Immature Granulocytes (abs) 0.01 0.00 - 0.11 K/uL    NRBC (Absolute) 0.00 K/uL   COMP METABOLIC PANEL    Collection Time: 01/10/25  8:40 PM   Result Value Ref Range    Sodium 139 135 - 145 mmol/L    Potassium 3.6 3.6 - 5.5 mmol/L    Chloride 104 96 - 112 mmol/L    Co2 24 20 - 33 mmol/L    Anion Gap 11.0 7.0 - 16.0    Glucose 99 65 - 99 mg/dL    Bun 12 8 - 22 mg/dL    Creatinine 0.61 0.50 - 1.40 mg/dL    Calcium 9.2 8.4 - 10.2 mg/dL    Correct Calcium 9.0 8.5 - 10.5 mg/dL    AST(SGOT) 17 12 - 45 U/L    ALT(SGPT) 15 2 - 50 U/L    Alkaline Phosphatase 143 (H) 30 - 99 U/L    Total Bilirubin 0.4 0.1 - 1.5 mg/dL    Albumin 4.2 3.2 - 4.9 g/dL    Total Protein 7.8 6.0 - 8.2 g/dL    Globulin 3.6 (H) 1.9 - 3.5 g/dL    A-G Ratio 1.2 g/dL   TROPONIN    Collection Time: 01/10/25  8:40 PM   Result Value Ref Range    Troponin T <6 6 - 19 ng/L   D-DIMER    Collection Time: 01/10/25  8:40 PM   Result Value Ref Range    D-Dimer 0.54 (H) 0.00 - 0.50 ug/mL (FEU)   ESTIMATED GFR    Collection Time: 01/10/25  8:40 PM   Result Value Ref Range    GFR (CKD-EPI) 100 >60 mL/min/1.73 m 2       I have independently interpreted this EKG    RADIOLOGY/PROCEDURES   I have independently interpreted the diagnostic imaging associated with this visit and am waiting the final reading from the radiologist.   My preliminary interpretation is as follows: no focal consolidation    Radiologist interpretation:  DX-CHEST-PORTABLE (1 VIEW)   Final Result      No acute cardiac or pulmonary abnormalities are  identified.          COURSE & MEDICAL DECISION MAKING    ASSESSMENT, COURSE AND PLAN  Care Narrative:   This is a 64-year-old female who is presenting for evaluation of transient bilateral breast pain since yesterday.  On arrival, her vital signs are stable.  On physical exam there is no evidence of cellulitis or abscess apparent.  Patient does have a strong family history of breast cancer.  Discussed with patient that I could not do breast cancer imaging here in the emergency room.  I advised that she follow-up with her primary care doctor in regards to this.  There is no indication for ultrasound to look for abscess at this time.    Given her pain is more underneath her breasts, will assess for potential cardiac cause.  EKG with no acute ischemic changes.  Troponin is less than 6 and she has been having pain for greater than 6 hours therefore no repeat necessary.  I do not think that this is ACS.  Age-adjusted D-dimer is not elevated therefore doubt PE.  There is no leukocytosis.  Chest x-ray negative for infection.    Results were discussed with patient.  I do think that she stable for PCP follow-up.  I stressed importance of follow-up with PCP to have further imaging of breast done especially given her family history.  Patient is agreeable to discharge plan with no further questions.              ADDITIONAL PROBLEMS MANAGED  None    DISPOSITION AND DISCUSSIONS  I have discussed management of the patient with the following physicians and DIANELYS's:  None    Discussion of management with other QHP or appropriate source(s): None     Escalation of care considered, and ultimately not performed:acute inpatient care management, however at this time, the patient is most appropriate for outpatient management    Barriers to care at this time, including but not limited to:  None .     Decision tools and prescription drugs considered including, but not limited to:  None .     The patient will return for new or worsening  symptoms and is stable at the time of discharge.    The patient is referred to a primary physician for blood pressure management, diabetic screening, and for all other preventative health concerns.    DISPOSITION:  Patient will be discharged home in stable condition.    FOLLOW UP:  Eileen Mccracken A.P.R.NMin  [address]  [facility] [address]  [phone]          [facility], Emergency Dept  [address]  [facility] [address]  [phone]          OUTPATIENT MEDICATIONS:  Discharge Medication List as of 1/10/2025  9:49 PM            FINAL DIAGNOSIS  1. Chest pain, unspecified type Acute        Electronically signed by: Cayla Henriquez M.D., 1/10/2025 8:00 PM

## 2025-01-11 NOTE — ED TRIAGE NOTES
"BP (!) 197/100   Pulse 81   Temp 36.8 °C (98.2 °F) (Temporal)   Resp 16   Ht 1.511 m (4' 11.5\")   Wt 72.3 kg (159 lb 6.3 oz)   LMP 12/04/2008   SpO2 95%   BMI 31.65 kg/m²   Chief Complaint   Patient presents with    Breast Pain     Started yesterday morning having pain to L breast   does not hurt to pressure breasts  just pain under breast   now pain is spreading to other breast as well  pain comes and goes  no pain at this time     Other     About 3 days ago started having chills  then nothing today       Comes in by herself   "

## 2025-02-26 ENCOUNTER — APPOINTMENT (OUTPATIENT)
Dept: MEDICAL GROUP | Facility: PHYSICIAN GROUP | Age: 65
End: 2025-02-26
Payer: COMMERCIAL

## 2025-02-26 ENCOUNTER — HOSPITAL ENCOUNTER (OUTPATIENT)
Dept: RADIOLOGY | Facility: MEDICAL CENTER | Age: 65
End: 2025-02-26
Payer: COMMERCIAL

## 2025-02-26 VITALS
DIASTOLIC BLOOD PRESSURE: 70 MMHG | RESPIRATION RATE: 18 BRPM | HEART RATE: 68 BPM | TEMPERATURE: 97 F | WEIGHT: 157.5 LBS | OXYGEN SATURATION: 98 % | BODY MASS INDEX: 31.75 KG/M2 | SYSTOLIC BLOOD PRESSURE: 130 MMHG | HEIGHT: 59 IN

## 2025-02-26 DIAGNOSIS — Z13.0 SCREENING FOR ENDOCRINE, METABOLIC AND IMMUNITY DISORDER: ICD-10-CM

## 2025-02-26 DIAGNOSIS — M25.521 ELBOW PAIN, RIGHT: ICD-10-CM

## 2025-02-26 DIAGNOSIS — M25.511 ACUTE PAIN OF RIGHT SHOULDER: ICD-10-CM

## 2025-02-26 DIAGNOSIS — Z13.228 SCREENING FOR ENDOCRINE, METABOLIC AND IMMUNITY DISORDER: ICD-10-CM

## 2025-02-26 DIAGNOSIS — E78.5 DYSLIPIDEMIA: ICD-10-CM

## 2025-02-26 DIAGNOSIS — Z13.29 SCREENING FOR ENDOCRINE, METABOLIC AND IMMUNITY DISORDER: ICD-10-CM

## 2025-02-26 DIAGNOSIS — E03.4 HYPOTHYROIDISM DUE TO ACQUIRED ATROPHY OF THYROID: ICD-10-CM

## 2025-02-26 DIAGNOSIS — E55.9 VITAMIN D DEFICIENCY: ICD-10-CM

## 2025-02-26 DIAGNOSIS — N63.0 BREAST NODULE: ICD-10-CM

## 2025-02-26 PROCEDURE — 73080 X-RAY EXAM OF ELBOW: CPT | Mod: RT

## 2025-02-26 RX ORDER — LEVOTHYROXINE SODIUM 88 UG/1
88 TABLET ORAL DAILY
Qty: 90 TABLET | Refills: 3 | Status: SHIPPED | OUTPATIENT
Start: 2025-02-26

## 2025-02-26 ASSESSMENT — FIBROSIS 4 INDEX: FIB4 SCORE: 1.23

## 2025-02-26 NOTE — LETTER
[facility]  AKBAR Pisano  [address]  [facility] [address]  Fax: [phone]   Authorization for Release/Disclosure of   Protected Health Information   Name: MICHAEL GARCIA : 1960 SSN: [id number]   Address: [address] [place] [id number] Phone:    There are no phone numbers on file.   I authorize the entity listed below to release/disclose the PHI below to:   [facility]/AKBAR Pisano and AKBAR Pisano   Provider or Entity Name:     Address   City, State, Zip   Phone:      Fax:     Reason for request: continuity of care   Information to be released:    [  ] LAST COLONOSCOPY,  including any PATH REPORT and follow-up  [  ] LAST FIT/COLOGUARD RESULT [  ] LAST DEXA  [  ] LAST MAMMOGRAM  [  ] LAST PAP  [  ] LAST LABS [  ] RETINA EXAM REPORT  [  ] IMMUNIZATION RECORDS  [  ] Release all info      [  ] Check here and initial the line next to each item to release ALL health information INCLUDING  _____ Care and treatment for drug and / or alcohol abuse  _____ HIV testing, infection status, or AIDS  _____ Genetic Testing    DATES OF SERVICE OR TIME PERIOD TO BE DISCLOSED: _____________  I understand and acknowledge that:  * This Authorization may be revoked at any time by you in writing, except if your health information has already been used or disclosed.  * Your health information that will be used or disclosed as a result of you signing this authorization could be re-disclosed by the recipient. If this occurs, your re-disclosed health information may no longer be protected by State or Federal laws.  * You may refuse to sign this Authorization. Your refusal will not affect your ability to obtain treatment.  * This Authorization becomes effective upon signing and will  on (date) __________.      If no date is indicated, this Authorization will  one (1) year from the signature date.    Name: Michael Garcia  Signature: Date:   2025     PLEASE  FAX REQUESTED RECORDS BACK TO: [phone]

## 2025-02-27 NOTE — PROGRESS NOTES
"Subjective:     Chief Complaint   Patient presents with    Joint Pain     History of Present Illness  The patient is a 64-year-old female who presents with left elbow and shoulder discomfort.    She reports experiencing pain in her right elbow and shoulder for approximately 1 month, with the elbow pain preceding the shoulder discomfort. She is right-handed and does not recall any specific trauma or injury to the area. She also reports no associated numbness or tingling in her fingers. She has been using Aspercreme for pain relief but has not taken any NSAIDs. She has a total gym at home and was lying on her back and doing some exercises when she felt pain in her arm. She also mentions difficulty in raising her arm while attempting to turn on a light switch in the bathroom. Movement exacerbates the pain. Aspercreme helps relieve the pain.    She had a nodule in her breast and underwent an ultrasound at [facility], which was reported as normal.    She is on thyroid medication and needs refills.    MEDICATIONS  Current: Aspercreme.    Allergies: Patient has no known allergies.  ROS per HPI  Health Maintenance: Deferred   Objective:     /70 (BP Location: Left arm, Patient Position: Sitting, BP Cuff Size: Adult)   Pulse 68   Temp 36.1 °C (97 °F) (Temporal)   Resp 18   Ht 1.499 m (4' 11\")   Wt 71.4 kg (157 lb 8 oz)   LMP 12/04/2008   SpO2 98%   Breastfeeding No   BMI 31.81 kg/m²  Body mass index is 31.81 kg/m².     Physical Exam  Vitals reviewed.   Constitutional:       General: She is not in acute distress.     Appearance: Normal appearance. She is not ill-appearing.   Cardiovascular:      Rate and Rhythm: Normal rate and regular rhythm.   Pulmonary:      Effort: Pulmonary effort is normal. No respiratory distress.   Musculoskeletal:      Right shoulder: Tenderness present. No swelling, deformity, effusion, laceration, bony tenderness or crepitus. Decreased range of motion. Normal " strength. Normal pulse.      Left shoulder: Normal.      Right elbow: No swelling, deformity, effusion or lacerations. Normal range of motion. Tenderness present in medial epicondyle and olecranon process. No radial head or lateral epicondyle tenderness.      Left elbow: Normal.   Skin:     Coloration: Skin is not jaundiced or pale.   Neurological:      General: No focal deficit present.      Mental Status: She is alert and oriented to person, place, and time.   Psychiatric:         Mood and Affect: Mood normal.         Behavior: Behavior normal.         Thought Content: Thought content normal.         Judgment: Judgment normal.        Results for orders placed or performed during the hospital encounter of 01/10/25   CBC WITH DIFFERENTIAL    Collection Time: 01/10/25  8:40 PM   Result Value Ref Range    WBC 8.2 4.8 - 10.8 K/uL    RBC 4.53 4.20 - 5.40 M/uL    Hemoglobin 15.3 12.0 - 16.0 g/dL    Hematocrit 44.8 37.0 - 47.0 %    MCV 98.9 (H) 81.4 - 97.8 fL    MCH 33.8 (H) 27.0 - 33.0 pg    MCHC 34.2 32.2 - 35.5 g/dL    RDW 46.4 35.9 - 50.0 fL    Platelet Count 224 164 - 446 K/uL    MPV 10.1 9.0 - 12.9 fL    Neutrophils-Polys 65.90 44.00 - 72.00 %    Lymphocytes 23.00 22.00 - 41.00 %    Monocytes 9.50 0.00 - 13.40 %    Eosinophils 0.60 0.00 - 6.90 %    Basophils 0.90 0.00 - 1.80 %    Immature Granulocytes 0.10 0.00 - 0.90 %    Nucleated RBC 0.00 0.00 - 0.20 /100 WBC    Neutrophils (Absolute) 5.40 1.82 - 7.42 K/uL    Lymphs (Absolute) 1.89 1.00 - 4.80 K/uL    Monos (Absolute) 0.78 0.00 - 0.85 K/uL    Eos (Absolute) 0.05 0.00 - 0.51 K/uL    Baso (Absolute) 0.07 0.00 - 0.12 K/uL    Immature Granulocytes (abs) 0.01 0.00 - 0.11 K/uL    NRBC (Absolute) 0.00 K/uL   COMP METABOLIC PANEL    Collection Time: 01/10/25  8:40 PM   Result Value Ref Range    Sodium 139 135 - 145 mmol/L    Potassium 3.6 3.6 - 5.5 mmol/L    Chloride 104 96 - 112 mmol/L    Co2 24 20 - 33 mmol/L    Anion Gap 11.0 7.0 - 16.0    Glucose 99 65 - 99 mg/dL     Bun 12 8 - 22 mg/dL    Creatinine 0.61 0.50 - 1.40 mg/dL    Calcium 9.2 8.4 - 10.2 mg/dL    Correct Calcium 9.0 8.5 - 10.5 mg/dL    AST(SGOT) 17 12 - 45 U/L    ALT(SGPT) 15 2 - 50 U/L    Alkaline Phosphatase 143 (H) 30 - 99 U/L    Total Bilirubin 0.4 0.1 - 1.5 mg/dL    Albumin 4.2 3.2 - 4.9 g/dL    Total Protein 7.8 6.0 - 8.2 g/dL    Globulin 3.6 (H) 1.9 - 3.5 g/dL    A-G Ratio 1.2 g/dL   TROPONIN    Collection Time: 01/10/25  8:40 PM   Result Value Ref Range    Troponin T <6 6 - 19 ng/L   D-DIMER    Collection Time: 01/10/25  8:40 PM   Result Value Ref Range    D-Dimer 0.54 (H) 0.00 - 0.50 ug/mL (FEU)   ESTIMATED GFR    Collection Time: 01/10/25  8:40 PM   Result Value Ref Range    GFR (CKD-EPI) 100 >60 mL/min/1.73 m 2   EKG (Now)    Collection Time: 25 12:30 AM   Result Value Ref Range    Report       [facility] Emergency Dept.    Test Date:  2025-01-10  Pt Name:    MICHAEL GARCIA                Department: [facility]  MRN:        [id number]                      Room:       [place] [address]  Gender:     Female                       Technician: [id number]  :        1960                   Requested By:LUCIANA HENRIQUEZ  Order #:    [id number]                    Reading MD: Luciana Henriquez    Measurements  Intervals                                Axis  Rate:       78                           P:          64  MO:         161                          QRS:        33  QRSD:       90                           T:          57  QT:         415  QTc:        473    Interpretive Statements  Sinus rhythm  Compared to ECG 2018 11:30:57  No significant changes  Electronically Signed On 2025 00:30:46 PST by Luciana Henriquez        Assessment and Plan:     The following treatment plan was discussed through shared decision making with the patient:    1. Hypothyroidism due to acquired atrophy of thyroid  TSH WITH REFLEX TO FT4    levothyroxine (SYNTHROID) 88 MCG Tab      2. Dyslipidemia  Comp Metabolic  Panel    Lipid Profile      3. Vitamin D deficiency  VITAMIN D,25 HYDROXY (DEFICIENCY)      4. Screening for endocrine, metabolic and immunity disorder  CBC WITH DIFFERENTIAL    Comp Metabolic Panel    HEMOGLOBIN A1C      5. Elbow pain, right  DX-ELBOW-COMPLETE 3+ RIGHT      6. Acute pain of right shoulder          Assessment & Plan  1. Right shoulder impingement.  The symptoms suggest a possible rotator cuff injury or shoulder impingement. She does not want to do any physical therapy and does not think anything is broken, so she does not want an x-ray. She is advised to take ibuprofen 600 mg daily for the next 2 weeks to reduce inflammation and swelling. Home exercises for shoulder mobility will be provided. If there is no improvement, further imaging or referral to physical therapy will be considered.    2. Right elbow pain.  The pain is localized to the bone. An x-ray of the elbow will be ordered to rule out any underlying issues. If the pain persists, further evaluation will be necessary.    3. Thyroid management.  A refill for her thyroid medication will be sent to [facility] pharmacy for the year. Screening labs, including thyroid function tests and liver function tests, will be ordered to monitor her condition. If any discrepancies are found in the lab results, adjustments to her medication will be made, and she will be asked to come in for a consultation.    4. Breast nodule.  The ultrasound results from [facility] were not received. A record release request will be signed to obtain the ultrasound results for review.    Health maintenance  Labs ordered for the following year to repeat and follow with current cholesterol as well as diabetes and thyroid.      Return in about 6 weeks (around 4/9/2025) for Imaging and shoulder pain.       Please note that this note was created using dictation with voice recognition software. I have made every reasonable attempt to correct obvious errors, but I  expect that there are errors of grammar and possibly content that I did not discover before finalizing the note.    JUAN Pisano  [facility]  [facility]

## 2025-03-14 ENCOUNTER — RESULTS FOLLOW-UP (OUTPATIENT)
Dept: MEDICAL GROUP | Facility: PHYSICIAN GROUP | Age: 65
End: 2025-03-14
Payer: COMMERCIAL

## 2025-05-14 ENCOUNTER — HOSPITAL ENCOUNTER (OUTPATIENT)
Dept: LAB | Facility: MEDICAL CENTER | Age: 65
End: 2025-05-14
Payer: COMMERCIAL

## 2025-05-14 DIAGNOSIS — Z13.228 SCREENING FOR ENDOCRINE, METABOLIC AND IMMUNITY DISORDER: ICD-10-CM

## 2025-05-14 DIAGNOSIS — Z13.29 SCREENING FOR ENDOCRINE, METABOLIC AND IMMUNITY DISORDER: ICD-10-CM

## 2025-05-14 DIAGNOSIS — E03.4 HYPOTHYROIDISM DUE TO ACQUIRED ATROPHY OF THYROID: ICD-10-CM

## 2025-05-14 DIAGNOSIS — E78.5 DYSLIPIDEMIA: ICD-10-CM

## 2025-05-14 DIAGNOSIS — E55.9 VITAMIN D DEFICIENCY: ICD-10-CM

## 2025-05-14 DIAGNOSIS — Z13.0 SCREENING FOR ENDOCRINE, METABOLIC AND IMMUNITY DISORDER: ICD-10-CM

## 2025-05-14 LAB
25(OH)D3 SERPL-MCNC: 34 NG/ML (ref 30–100)
ALBUMIN SERPL BCP-MCNC: 4.2 G/DL (ref 3.2–4.9)
ALBUMIN/GLOB SERPL: 1.2 G/DL
ALP SERPL-CCNC: 133 U/L (ref 30–99)
ALT SERPL-CCNC: 16 U/L (ref 2–50)
ANION GAP SERPL CALC-SCNC: 9 MMOL/L (ref 7–16)
AST SERPL-CCNC: 24 U/L (ref 12–45)
BASOPHILS # BLD AUTO: 1.3 % (ref 0–1.8)
BASOPHILS # BLD: 0.07 K/UL (ref 0–0.12)
BILIRUB SERPL-MCNC: 0.5 MG/DL (ref 0.1–1.5)
BUN SERPL-MCNC: 10 MG/DL (ref 8–22)
CALCIUM ALBUM COR SERPL-MCNC: 9 MG/DL (ref 8.5–10.5)
CALCIUM SERPL-MCNC: 9.2 MG/DL (ref 8.5–10.5)
CHLORIDE SERPL-SCNC: 106 MMOL/L (ref 96–112)
CHOLEST SERPL-MCNC: 192 MG/DL (ref 100–199)
CO2 SERPL-SCNC: 25 MMOL/L (ref 20–33)
CREAT SERPL-MCNC: 0.72 MG/DL (ref 0.5–1.4)
EOSINOPHIL # BLD AUTO: 0.04 K/UL (ref 0–0.51)
EOSINOPHIL NFR BLD: 0.7 % (ref 0–6.9)
ERYTHROCYTE [DISTWIDTH] IN BLOOD BY AUTOMATED COUNT: 46.3 FL (ref 35.9–50)
EST. AVERAGE GLUCOSE BLD GHB EST-MCNC: 114 MG/DL
GFR SERPLBLD CREATININE-BSD FMLA CKD-EPI: 93 ML/MIN/1.73 M 2
GLOBULIN SER CALC-MCNC: 3.5 G/DL (ref 1.9–3.5)
GLUCOSE SERPL-MCNC: 109 MG/DL (ref 65–99)
HBA1C MFR BLD: 5.6 % (ref 4–5.6)
HCT VFR BLD AUTO: 44.3 % (ref 37–47)
HDLC SERPL-MCNC: 62 MG/DL
HGB BLD-MCNC: 15.4 G/DL (ref 12–16)
IMM GRANULOCYTES # BLD AUTO: 0.01 K/UL (ref 0–0.11)
IMM GRANULOCYTES NFR BLD AUTO: 0.2 % (ref 0–0.9)
LDLC SERPL CALC-MCNC: 103 MG/DL
LYMPHOCYTES # BLD AUTO: 1.57 K/UL (ref 1–4.8)
LYMPHOCYTES NFR BLD: 28.8 % (ref 22–41)
MCH RBC QN AUTO: 34.1 PG (ref 27–33)
MCHC RBC AUTO-ENTMCNC: 34.8 G/DL (ref 32.2–35.5)
MCV RBC AUTO: 98 FL (ref 81.4–97.8)
MONOCYTES # BLD AUTO: 0.58 K/UL (ref 0–0.85)
MONOCYTES NFR BLD AUTO: 10.6 % (ref 0–13.4)
NEUTROPHILS # BLD AUTO: 3.19 K/UL (ref 1.82–7.42)
NEUTROPHILS NFR BLD: 58.4 % (ref 44–72)
NRBC # BLD AUTO: 0 K/UL
NRBC BLD-RTO: 0 /100 WBC (ref 0–0.2)
PLATELET # BLD AUTO: 241 K/UL (ref 164–446)
PMV BLD AUTO: 10.6 FL (ref 9–12.9)
POTASSIUM SERPL-SCNC: 4.1 MMOL/L (ref 3.6–5.5)
PROT SERPL-MCNC: 7.7 G/DL (ref 6–8.2)
RBC # BLD AUTO: 4.52 M/UL (ref 4.2–5.4)
SODIUM SERPL-SCNC: 140 MMOL/L (ref 135–145)
TRIGL SERPL-MCNC: 137 MG/DL (ref 0–149)
TSH SERPL DL<=0.005 MIU/L-ACNC: 0.69 UIU/ML (ref 0.38–5.33)
WBC # BLD AUTO: 5.5 K/UL (ref 4.8–10.8)

## 2025-05-14 PROCEDURE — 36415 COLL VENOUS BLD VENIPUNCTURE: CPT

## 2025-05-14 PROCEDURE — 84443 ASSAY THYROID STIM HORMONE: CPT

## 2025-05-14 PROCEDURE — 82306 VITAMIN D 25 HYDROXY: CPT

## 2025-05-14 PROCEDURE — 80061 LIPID PANEL: CPT

## 2025-05-14 PROCEDURE — 85025 COMPLETE CBC W/AUTO DIFF WBC: CPT

## 2025-05-14 PROCEDURE — 83036 HEMOGLOBIN GLYCOSYLATED A1C: CPT

## 2025-05-14 PROCEDURE — 80053 COMPREHEN METABOLIC PANEL: CPT

## 2025-05-28 ENCOUNTER — APPOINTMENT (OUTPATIENT)
Dept: MEDICAL GROUP | Facility: PHYSICIAN GROUP | Age: 65
End: 2025-05-28
Payer: COMMERCIAL

## 2025-05-28 VITALS
SYSTOLIC BLOOD PRESSURE: 140 MMHG | WEIGHT: 157 LBS | BODY MASS INDEX: 31.65 KG/M2 | TEMPERATURE: 97.4 F | HEIGHT: 59 IN | DIASTOLIC BLOOD PRESSURE: 80 MMHG | HEART RATE: 77 BPM | OXYGEN SATURATION: 97 % | RESPIRATION RATE: 16 BRPM

## 2025-05-28 DIAGNOSIS — R74.8 ELEVATED ALKALINE PHOSPHATASE LEVEL: ICD-10-CM

## 2025-05-28 DIAGNOSIS — R73.03 PREDIABETES: ICD-10-CM

## 2025-05-28 DIAGNOSIS — E78.5 DYSLIPIDEMIA: ICD-10-CM

## 2025-05-28 DIAGNOSIS — R09.81 NASAL CONGESTION: ICD-10-CM

## 2025-05-28 DIAGNOSIS — Z87.09 HISTORY OF NASAL POLYP: ICD-10-CM

## 2025-05-28 DIAGNOSIS — R43.8 DIFFICULTY SMELLING: ICD-10-CM

## 2025-05-28 DIAGNOSIS — M25.521 ELBOW PAIN, RIGHT: ICD-10-CM

## 2025-05-28 DIAGNOSIS — M25.511 ACUTE PAIN OF RIGHT SHOULDER: Primary | ICD-10-CM

## 2025-05-28 PROCEDURE — 3079F DIAST BP 80-89 MM HG: CPT

## 2025-05-28 PROCEDURE — 99214 OFFICE O/P EST MOD 30 MIN: CPT

## 2025-05-28 PROCEDURE — 3077F SYST BP >= 140 MM HG: CPT

## 2025-05-28 ASSESSMENT — FIBROSIS 4 INDEX: FIB4 SCORE: 1.57

## 2025-05-28 NOTE — PROGRESS NOTES
"Subjective:     Chief Complaint   Patient presents with    Shoulder Pain     Right shoulder still hurting      History of Present Illness  The patient is a 64-year-old female here following up on shoulder pain, nasal congestion, and elevated alkaline phosphatase.    She reports persistent shoulder discomfort, which is particularly pronounced during the night when she lies on the affected side. Despite engaging in physical therapy exercises, she experienced an exacerbation of her symptoms after using monkey bars with her granddaughter, leading to sleep disturbances until 1:00 AM. She does not recall any popping sensation. She speculates that she may have strained a muscle. Topical application of Bengay provided no relief, but the use of a heating pad seemed to alleviate the pain. She has not been taking ibuprofen for pain management. She declines a referral for physical therapy at this time.    She also reports occasional elbow pain, which she attributes to overuse.    She experiences nasal congestion and swelling, which prevents her from blowing her nose. She reports the presence of thick white mucus in her nasal passages. She has attempted nasal rinses but found them ineffective. She has previously consulted an ENT specialist due to anosmia, who diagnosed her with nasal polyps and performed a surgical intervention to remove them. She was prescribed a nasal spray but discontinued its use due to associated dizziness.    She is currently taking over-the-counter vitamin D supplements and calcium.    PAST SURGICAL HISTORY:  - Nasal polyp removal    Allergies: Patient has no known allergies.  ROS per HPI  Health Maintenance: Completed     Objective:     BP (!) 140/80 (BP Location: Left arm, Patient Position: Sitting, BP Cuff Size: Adult)   Pulse 77   Temp 36.3 °C (97.4 °F) (Temporal)   Resp 16   Ht 1.499 m (4' 11\")   Wt 71.2 kg (157 lb)   LMP 12/04/2008   SpO2 97%   Breastfeeding No   BMI 31.71 kg/m²  Body mass " index is 31.71 kg/m².     Physical Exam   Results  Labs   - Complete Blood Count: Normal   - Fasting Glucose: Slightly high   - Hemoglobin A1c: Normal   - Cholesterol: Slightly elevated   - Alkaline Phosphatase: Still high, related to bone   - Vitamin D: Normal at 34    Imaging   - Nuclear Medicine Bone Scan: No signs of bone cancer, some uptake for normal bone degenerative changes    Results for orders placed or performed during the hospital encounter of 05/14/25   TSH WITH REFLEX TO FT4    Collection Time: 05/14/25 10:47 AM   Result Value Ref Range    TSH 0.694 0.380 - 5.330 uIU/mL   VITAMIN D,25 HYDROXY (DEFICIENCY)    Collection Time: 05/14/25 10:47 AM   Result Value Ref Range    25-Hydroxy   Vitamin D 25 34 30 - 100 ng/mL   Lipid Profile    Collection Time: 05/14/25 10:47 AM   Result Value Ref Range    Cholesterol,Tot 192 100 - 199 mg/dL    Triglycerides 137 0 - 149 mg/dL    HDL 62 >=40 mg/dL     (H) <100 mg/dL   HEMOGLOBIN A1C    Collection Time: 05/14/25 10:47 AM   Result Value Ref Range    Glycohemoglobin 5.6 4.0 - 5.6 %    Est Avg Glucose 114 mg/dL   Comp Metabolic Panel    Collection Time: 05/14/25 10:47 AM   Result Value Ref Range    Sodium 140 135 - 145 mmol/L    Potassium 4.1 3.6 - 5.5 mmol/L    Chloride 106 96 - 112 mmol/L    Co2 25 20 - 33 mmol/L    Anion Gap 9.0 7.0 - 16.0    Glucose 109 (H) 65 - 99 mg/dL    Bun 10 8 - 22 mg/dL    Creatinine 0.72 0.50 - 1.40 mg/dL    Calcium 9.2 8.5 - 10.5 mg/dL    Correct Calcium 9.0 8.5 - 10.5 mg/dL    AST(SGOT) 24 12 - 45 U/L    ALT(SGPT) 16 2 - 50 U/L    Alkaline Phosphatase 133 (H) 30 - 99 U/L    Total Bilirubin 0.5 0.1 - 1.5 mg/dL    Albumin 4.2 3.2 - 4.9 g/dL    Total Protein 7.7 6.0 - 8.2 g/dL    Globulin 3.5 1.9 - 3.5 g/dL    A-G Ratio 1.2 g/dL   CBC WITH DIFFERENTIAL    Collection Time: 05/14/25 10:47 AM   Result Value Ref Range    WBC 5.5 4.8 - 10.8 K/uL    RBC 4.52 4.20 - 5.40 M/uL    Hemoglobin 15.4 12.0 - 16.0 g/dL    Hematocrit 44.3 37.0 - 47.0  %    MCV 98.0 (H) 81.4 - 97.8 fL    MCH 34.1 (H) 27.0 - 33.0 pg    MCHC 34.8 32.2 - 35.5 g/dL    RDW 46.3 35.9 - 50.0 fL    Platelet Count 241 164 - 446 K/uL    MPV 10.6 9.0 - 12.9 fL    Neutrophils-Polys 58.40 44.00 - 72.00 %    Lymphocytes 28.80 22.00 - 41.00 %    Monocytes 10.60 0.00 - 13.40 %    Eosinophils 0.70 0.00 - 6.90 %    Basophils 1.30 0.00 - 1.80 %    Immature Granulocytes 0.20 0.00 - 0.90 %    Nucleated RBC 0.00 0.00 - 0.20 /100 WBC    Neutrophils (Absolute) 3.19 1.82 - 7.42 K/uL    Lymphs (Absolute) 1.57 1.00 - 4.80 K/uL    Monos (Absolute) 0.58 0.00 - 0.85 K/uL    Eos (Absolute) 0.04 0.00 - 0.51 K/uL    Baso (Absolute) 0.07 0.00 - 0.12 K/uL    Immature Granulocytes (abs) 0.01 0.00 - 0.11 K/uL    NRBC (Absolute) 0.00 K/uL   ESTIMATED GFR    Collection Time: 05/14/25 10:47 AM   Result Value Ref Range    GFR (CKD-EPI) 93 >60 mL/min/1.73 m 2      Assessment and Plan:     The following treatment plan was discussed through shared decision making with the patient:    1. Acute pain of right shoulder  DX-SHOULDER 2+ RIGHT      2. History of nasal polyp  Referral to ENT      3. Difficulty smelling  Referral to ENT      4. Nasal congestion  Referral to ENT      5. Elbow pain, right        6. Dyslipidemia        7. Prediabetes        8. Elevated alkaline phosphatase level          Assessment & Plan  1. Shoulder pain.  - The physical examination did not reveal any significant abnormalities, suggesting a possible impingement syndrome or minor tear.  - An x-ray will be ordered to investigate potential structural issues that could be causing recurrent flare-ups.  - She has been advised to use Voltaren gel for inflammation and swelling.  - If the x-ray results indicate a structural problem, a referral to a sports medicine specialist may be considered for potential injection therapy.    2. Elbow pain.  - The elbow pain is likely due to overuse or overstretching, causing irritation.  - Heat application has been  recommended for relief.  - She has also been advised to use Voltaren gel during severe flare-ups.  - No further treatment is required at this time.    3. Prediabetes.  - Her fasting glucose levels were slightly elevated, but her hemoglobin A1c was within the normal range.  - She is advised to reduce her intake of added sugars and increase her physical activity.  - No medication is prescribed at this time.  - Continued monitoring of blood glucose levels is recommended.    4. Hypercholesterolemia.  - Her cholesterol levels are marginally elevated.  - She is not currently on any cholesterol-lowering medications.  - She is advised to continue her current lifestyle modifications.  - No further action is required at this time.    5. Elevated alkaline phosphatase.  - Her alkaline phosphatase levels remain high but have decreased since the last check.  - Previous tests indicated that the elevation is bone-related rather than liver-related.  - A nuclear medicine bone scan showed no signs of bone cancer, only normal degenerative changes.  - No further action is required at this time, but levels will be monitored.    6. Vitamin D deficiency.  - Her vitamin D levels are within the normal range, though slightly lower than the previous reading.  - She is advised to continue her current regimen of over-the-counter vitamin D and calcium supplements.  - No changes to her current supplementation are necessary.  - Continued monitoring of vitamin D levels is recommended.    7. Nasal congestion.  - She has a history of nasal polyps and is experiencing difficulty with smell and nasal congestion.  - A referral to an ENT specialist will be made for further evaluation and potential treatment.  - Potential treatments may include Flonase nasal spray or additional polyp removal.  - Follow-up with the ENT specialist is recommended to determine the appropriate course of action.    Return if symptoms worsen or fail to improve.       Please note  that this note was created using dictation with voice recognition software. I have made every reasonable attempt to correct obvious errors, but I expect that there are errors of grammar and possibly content that I did not discover before finalizing the note.    JUAN Pisano  [facility]

## 2025-06-06 NOTE — Clinical Note
REFERRAL APPROVAL NOTICE         Sent on June 6, 2025                   Lyn Morris  [address]  [place] [address]                   Dear Ms. Morris,    After a careful review of the medical information and benefit coverage, [facility] has processed your referral. See below for additional details.    If applicable, you must be actively enrolled with your insurance for coverage of the authorized service. If you have any questions regarding your coverage, please contact your insurance directly.    REFERRAL INFORMATION   Referral #:  [id number]  Referred-To Provider    Referred-By Provider:  Otolaryngology    AKBAR Pisano   [facility]      [address]  [facility] [id number] [phone] [address]  [facility] [address]  [phone]    Referral Start Date:  05/28/2025  Referral End Date:   05/28/2026             SCHEDULING  If you do not already have an appointment, please call [phone] to make an appointment.     MORE INFORMATION  If you do not already have a [employer] account, sign up at: [email or web address]  You can access your medical information, make appointments, see lab results, billing information, and more.  If you have questions regarding this referral, please contact  the [facility] Referrals department at:             [phone]. Monday - Friday 8:00AM - 5:00PM.     Sincerely,    [facility]

## 2025-06-13 ENCOUNTER — APPOINTMENT (OUTPATIENT)
Dept: RADIOLOGY | Facility: MEDICAL CENTER | Age: 65
End: 2025-06-13
Payer: COMMERCIAL

## 2025-06-13 DIAGNOSIS — M25.511 ACUTE PAIN OF RIGHT SHOULDER: ICD-10-CM

## 2025-06-13 PROCEDURE — 73030 X-RAY EXAM OF SHOULDER: CPT | Mod: RT

## 2025-06-24 ENCOUNTER — RESULTS FOLLOW-UP (OUTPATIENT)
Dept: MEDICAL GROUP | Facility: PHYSICIAN GROUP | Age: 65
End: 2025-06-24